# Patient Record
Sex: FEMALE | Race: WHITE | Employment: FULL TIME | URBAN - METROPOLITAN AREA
[De-identification: names, ages, dates, MRNs, and addresses within clinical notes are randomized per-mention and may not be internally consistent; named-entity substitution may affect disease eponyms.]

---

## 2017-01-03 ENCOUNTER — OFFICE VISIT (OUTPATIENT)
Dept: ORTHOPEDIC SURGERY | Facility: CLINIC | Age: 57
End: 2017-01-03

## 2017-01-03 VITALS
WEIGHT: 170 LBS | BODY MASS INDEX: 32.1 KG/M2 | HEIGHT: 61 IN | HEART RATE: 82 BPM | SYSTOLIC BLOOD PRESSURE: 111 MMHG | TEMPERATURE: 96.6 F | DIASTOLIC BLOOD PRESSURE: 64 MMHG

## 2017-01-03 DIAGNOSIS — Z98.890 S/P TRIGGER FINGER RELEASE: Primary | ICD-10-CM

## 2017-01-03 RX ORDER — AZITHROMYCIN 250 MG/1
250 TABLET, FILM COATED ORAL DAILY
COMMUNITY

## 2017-01-03 NOTE — PROGRESS NOTES
HISTORY OF PRESENT ILLNESS:  Elizabeth Baca returns 13 days status post her left ring finger trigger release. She is doing well today. She has minimal pain associated with the release site. She rates it as a 2 on a 10-point scale and has a somewhat dull, achy characteristic. PHYSICAL EXAM:  The surgical site remains intact. There are six interrupted nylon sutures present and dried, bloody drainage associated with the surgical complex. There is soft tissue swelling bracketing the surgical complex has improved. PROCEDURE:  The area was cleaned with alcohol and using clean technique using a needle  and a No. 11 blade, all six nylon sutures were removed with loops identified with no complications. PLAN:  The patient may get the surgical site wet but avoid any prolonged soaking over the next 24 hours. She did request today a copy of her blood work and EKG.

## 2017-01-09 ENCOUNTER — OFFICE VISIT (OUTPATIENT)
Dept: ORTHOPEDIC SURGERY | Facility: CLINIC | Age: 57
End: 2017-01-09

## 2017-01-09 VITALS
BODY MASS INDEX: 31.53 KG/M2 | SYSTOLIC BLOOD PRESSURE: 117 MMHG | TEMPERATURE: 98 F | HEART RATE: 86 BPM | DIASTOLIC BLOOD PRESSURE: 76 MMHG | HEIGHT: 61 IN | WEIGHT: 167 LBS | RESPIRATION RATE: 15 BRPM

## 2017-01-09 DIAGNOSIS — Z98.890 S/P TRIGGER FINGER RELEASE: Primary | ICD-10-CM

## 2017-01-09 DIAGNOSIS — Z48.89 ENCOUNTER FOR POSTOPERATIVE WOUND CHECK: ICD-10-CM

## 2017-01-09 DIAGNOSIS — M79.642 HAND PAIN, LEFT: ICD-10-CM

## 2017-01-09 NOTE — MR AVS SNAPSHOT
Visit Information Date & Time Provider Department Dept. Phone Encounter #  
 1/9/2017  3:00 PM Jose Enrique Yee PA-C [facility] [phone] [id number] Upcoming Health Maintenance Date Due Hepatitis C Screening 1960 DTaP/Tdap/Td series (1 - Tdap) 1/15/1981 PAP AKA CERVICAL CYTOLOGY 1/15/1981 FOBT Q 1 YEAR AGE 50-75 1/15/2010 INFLUENZA AGE 9 TO ADULT 8/1/2016 BREAST CANCER SCRN MAMMOGRAM 4/15/2018 Allergies as of 1/9/2017  Review Complete On: 1/9/2017 By: Jose Enrique Yee PA-C Severity Noted Reaction Type Reactions Codeine  01/08/2013    Other (comments) Gets severe headache from codeine Penicillin G  01/08/2013    Rash Current Immunizations  Never Reviewed No immunizations on file. Not reviewed this visit You Were Diagnosed With   
  
 Codes Comments S/P trigger finger release    -  Primary ICD-10-CM: I08.084 ICD-9-CM: V45.89 Vitals BP Pulse Temp Resp Height(growth percentile) Weight(growth percentile) 117/76 86 98 °F (36.7 °C) 15 5' 1\" (1.549 m) 167 lb (75.8 kg) BMI OB Status Smoking Status 31.55 kg/m2 Hysterectomy Never Smoker Vitals History BMI and BSA Data Body Mass Index Body Surface Area 31.55 kg/m 2 1.81 m 2 Your Updated Medication List  
  
   
This list is accurate as of: 1/9/17  3:28 PM.  Always use your most recent med list.  
  
  
  
  
 ARIPiprazole 15 mg tablet Commonly known as:  ABILIFY  
  
 azithromycin 250 mg tablet Commonly known as:  Alice Arnie Take 250 mg by mouth daily. cetirizine 10 mg tablet Commonly known as:  ZYRTEC TK 1 T PO D  
  
 HYDROcodone-acetaminophen 7.5-325 mg per tablet Commonly known as:  Ramón Punt Take 1 Tab by mouth every six (6) hours as needed for Pain. Max Daily Amount: 4 Tabs. Indications: PAIN, 12/21/16 Left trigger finger release * LIPITOR 20 mg tablet Generic drug:  atorvastatin Take 20 mg by mouth daily. * atorvastatin 10 mg tablet Commonly known as:  LIPITOR TK 1 T PO HS  
  
 * MIRAPEX 0.125 mg tablet Generic drug:  pramipexole Take 0.125 mg by mouth three (3) times daily. * pramipexole 1 mg tablet Commonly known as:  MIRAPEX MUCINEX 1,200 mg Ta12 ER tablet Generic drug:  guaiFENesin TK 1 T PO D  
  
 * PROzac 20 mg capsule Generic drug:  FLUoxetine Take 20 mg by mouth daily. * FLUoxetine 40 mg capsule Commonly known as:  PROzac * RITALIN 10 mg tablet Generic drug:  methylphenidate Take 10 mg by mouth daily. * RITALIN LA 20 mg LA capsule Generic drug:  methylphenidate Take 20 mg by mouth three (3) times daily. * methylphenidate 54 mg CR tablet Commonly known as:  CONCERTA TK 1 T PO QAM FOR ADHD SYNTHROID 100 mcg tablet Generic drug:  levothyroxine Take 100 mcg by mouth Daily (before breakfast). traZODone 100 mg tablet Commonly known as:  Devyn Michelle * Notice: This list has 9 medication(s) that are the same as other medications prescribed for you. Read the directions carefully, and ask your doctor or other care provider to review them with you. Please provide this summary of care documentation to your next provider. Your primary care clinician is listed as Thanh Ruiz. If you have any questions after today's visit, please call [phone].

## 2017-01-09 NOTE — PROGRESS NOTES
HISTORY OF PRESENT ILLNESS:   Paul Yuan returns. She is following for what she believes is a postop complication from her left ring finger trigger release. Her surgical date, December 21, 2016, date of suture removal, January 3, 2017. She is a teacher and a couple of days ago, when she was lifting a heavy stack of paper, she felt a sudden pain associated with the trigger finger release side. She removed the dressing, looked down, and noticed that the skin on the surface had broken and she could see into what she called, inside the wound. She has kept the area dressed since then and would like it rechecked today to confirm there is no infection or worry that the skin has broken down or failed to heal.     PHYSICAL EXAM:  She is a 68-year-old, obese,  female, atraumatic, normocephalic, alert and oriented times three, sitting on the table comfortably. Examination to the left volar hand reveals 1.5 cm proximal from the ring finger metacarpophalangeal joint a transverse, 2 cm incision. There is dry skin bracketing the surgical site. There is no active bleeding. No evidence of erythema or infection. There is new skin visible under the dry, cracked superficial skin. She has full extension of all digits of the left hand without difficulties. She has full flexion and extension of all digits of the left hand without limitation or pain. Distal sensation is intact fully to the left upper extremity. Capillary refill is brisk and less than two seconds to the left upper extremity. There is noted no active or passive triggering of the left ring finger. Today, there is no evidence of complication post-surgically at the surgical site. I discussed this. PLAN:  I discussed her condition and the importance to note of healing, as is the new the new tissue it generates from the basement membrane upwards.   It is not uncommon to see the dry skin on the surface noting that the skin is essentially dead cells and that she may notice further drying and flaking of the skin. She may use cocoa butter or Vitamin E for moisturizer to that area. She was asked to avoid getting surgical site wet in a prolonged fashion for the next three to four days. Should she notice any redness, have increased pain, notice any break in the new skin and/or bleeding, she should call this office as soon as possible for evaluation. Otherwise, we are going to plan on seeing her on a prn basis.

## 2017-11-20 ENCOUNTER — OFFICE VISIT (OUTPATIENT)
Dept: ORTHOPEDIC SURGERY | Facility: CLINIC | Age: 57
End: 2017-11-20

## 2017-11-20 VITALS
HEIGHT: 61 IN | BODY MASS INDEX: 31.87 KG/M2 | OXYGEN SATURATION: 97 % | RESPIRATION RATE: 18 BRPM | SYSTOLIC BLOOD PRESSURE: 113 MMHG | DIASTOLIC BLOOD PRESSURE: 60 MMHG | WEIGHT: 168.8 LBS | HEART RATE: 80 BPM | TEMPERATURE: 96.8 F

## 2017-11-20 DIAGNOSIS — M65.311 TRIGGER FINGER OF RIGHT THUMB: Primary | ICD-10-CM

## 2017-11-20 DIAGNOSIS — Z01.812 BLOOD TESTS PRIOR TO TREATMENT OR PROCEDURE: ICD-10-CM

## 2017-11-20 DIAGNOSIS — M65.311 TRIGGER THUMB OF RIGHT HAND: Primary | ICD-10-CM

## 2017-11-20 DIAGNOSIS — Z01.811 PRE-OPERATIVE RESPIRATORY EXAMINATION: ICD-10-CM

## 2017-11-20 DIAGNOSIS — Z98.890 S/P TRIGGER FINGER RELEASE: ICD-10-CM

## 2017-11-20 DIAGNOSIS — Z01.810 PRE-OPERATIVE CARDIOVASCULAR EXAMINATION: ICD-10-CM

## 2017-11-20 DIAGNOSIS — M79.641 RIGHT HAND PAIN: ICD-10-CM

## 2017-11-20 RX ORDER — METFORMIN HYDROCHLORIDE 500 MG/1
TABLET, EXTENDED RELEASE ORAL
Refills: 3 | COMMUNITY
Start: 2017-11-04

## 2017-11-20 NOTE — MR AVS SNAPSHOT
Visit Information Date & Time Provider Department Dept. Phone Encounter #  
 11/20/2017  3:30 PM Rocael Mayorga, [address] [facility] [place] [id number]  Follow-up Instructions Return if symptoms worsen or fail to improve. Upcoming Health Maintenance Date Due Hepatitis C Screening 1960 DTaP/Tdap/Td series (1 - Tdap) 1/15/1981 PAP AKA CERVICAL CYTOLOGY 1/15/1981 FOBT Q 1 YEAR AGE 50-75 1/15/2010 Influenza Age 5 to Adult 8/1/2017 BREAST CANCER SCRN MAMMOGRAM 4/15/2018 Allergies as of 11/20/2017  Review Complete On: 11/20/2017 By: Rocael Mayorga MD  
  
 Severity Noted Reaction Type Reactions Codeine  01/08/2013    Other (comments) Gets severe headache from codeine Penicillin G  01/08/2013    Rash Current Immunizations  Never Reviewed No immunizations on file. Not reviewed this visit You Were Diagnosed With   
  
 Codes Comments Trigger finger of right thumb    -  Primary ICD-10-CM: J22.946 ICD-9-CM: 727.03 Right hand pain     ICD-10-CM: M79.641 ICD-9-CM: 729.5 S/P trigger finger release     ICD-10-CM: Q85.933 ICD-9-CM: V45.89 Vitals BP Pulse Temp Resp Height(growth percentile) Weight(growth percentile) 113/60 80 96.8 °F (36 °C) (Oral) 18 5' 1\" (1.549 m) 168 lb 12.8 oz (76.6 kg) SpO2 BMI OB Status Smoking Status 97% 31.89 kg/m2 Hysterectomy Never Smoker BMI and BSA Data Body Mass Index Body Surface Area  
 31.89 kg/m 2 1.82 m 2 Your Updated Medication List  
  
   
This list is accurate as of: 11/20/17  4:16 PM.  Always use your most recent med list.  
  
  
  
  
 ARIPiprazole 15 mg tablet Commonly known as:  ABILIFY  
  
 azithromycin 250 mg tablet Commonly known as:  Kevinjamshid Estrada Take 250 mg by mouth daily. cetirizine 10 mg tablet Commonly known as:  ZYRTEC TK 1 T PO D  
  
 HYDROcodone-acetaminophen 7.5-325 mg per tablet Commonly known as:  Gabby Mura Take 1 Tab by mouth every six (6) hours as needed for Pain. Max Daily Amount: 4 Tabs. Indications: PAIN, 12/21/16 Left trigger finger release * LIPITOR 20 mg tablet Generic drug:  atorvastatin Take 20 mg by mouth daily. * atorvastatin 10 mg tablet Commonly known as:  LIPITOR TK 1 T PO HS  
  
 metFORMIN  mg tablet Commonly known as:  GLUCOPHAGE XR  
TK 1 T PO HS  
  
 * MIRAPEX 0.125 mg tablet Generic drug:  pramipexole Take 0.125 mg by mouth three (3) times daily. * pramipexole 1 mg tablet Commonly known as:  MIRAPEX  
three (3) times daily. MUCINEX 1,200 mg Ta12 ER tablet Generic drug:  guaiFENesin TK 1 T PO D  
  
 * PROzac 20 mg capsule Generic drug:  FLUoxetine Take 20 mg by mouth daily. * FLUoxetine 40 mg capsule Commonly known as:  PROzac * RITALIN 10 mg tablet Generic drug:  methylphenidate HCl Take 10 mg by mouth three (3) times daily. * RITALIN LA 20 mg LA capsule Generic drug:  methylphenidate HCl Take 20 mg by mouth three (3) times daily. * methylphenidate HCl 54 mg CR tablet Commonly known as:  CONCERTA TK 1 T PO QAM FOR ADHD SYNTHROID 100 mcg tablet Generic drug:  levothyroxine Take 100 mcg by mouth Daily (before breakfast). traZODone 100 mg tablet Commonly known as:  Crystal Rosas * Notice: This list has 9 medication(s) that are the same as other medications prescribed for you. Read the directions carefully, and ask your doctor or other care provider to review them with you. Follow-up Instructions Return if symptoms worsen or fail to improve. Patient Instructions Trigger Finger Release: Before Your Surgery What is trigger finger release? Trigger finger release is surgery to make it easier to bend and straighten your finger.  Your doctor will make a cut in the tissue over the tendon that helps bend your finger. This cut is called an incision. It will allow the tendon to move freely without pain. This surgery will probably be done while you are awake. The doctor will give you a shot (injection) to numb your hand and prevent pain. You also may get medicine to help you relax. The doctor will make an incision in the skin of your finger or palm. He or she will make a cut to open the tissue over the swollen part of the tendon. The doctor will close the skin incision with stitches. After surgery, you will have a small scar on your finger or palm. This will fade with time. It will probably take about 6 weeks for your hand to heal. After it heals, your finger may move easily without pain. You will go home the same day as the surgery. How soon you can return to work depends on your job. If you can do your job without using your hand, you may be able to go back in 1 or 2 days. But if your job requires you to do repeated finger or hand movements, put pressure on your hand, or lift things, you may need to take more time off work. Follow-up care is a key part of your treatment and safety. Be sure to make and go to all appointments, and call your doctor if you are having problems. It's also a good idea to know your test results and keep a list of the medicines you take. What happens before surgery? ?Surgery can be stressful. This information will help you understand what you can expect. And it will help you safely prepare for surgery. ? Preparing for surgery ? · Understand exactly what surgery is planned, along with the risks, benefits, and other options. · Tell your doctors ALL the medicines, vitamins, supplements, and herbal remedies you take. Some of these can increase the risk of bleeding or interact with anesthesia. ? · If you take blood thinners, such as warfarin (Coumadin), clopidogrel (Plavix), or aspirin, be sure to talk to your doctor.  He or she will tell you if you should stop taking these medicines before your surgery. Make sure that you understand exactly what your doctor wants you to do.  
? · Your doctor will tell you which medicines to take or stop before your surgery. You may need to stop taking certain medicines a week or more before surgery. So talk to your doctor as soon as you can.  
? · If you have an advance directive, let your doctor know. It may include a living will and a durable power of  for health care. Bring a copy to the hospital. If you don't have one, you may want to prepare one. It lets your doctor and loved ones know your health care wishes. Doctors advise that everyone prepare these papers before any type of surgery or procedure. What happens on the day of surgery? · Follow the instructions exactly about when to stop eating and drinking. If you don't, your surgery may be canceled. If your doctor told you to take your medicines on the day of surgery, take them with only a sip of water. ? · Take a bath or shower before you come in for your surgery. Do not apply lotions, perfumes, deodorants, or nail polish. ? · Do not shave the surgical site yourself. ? · Take off all jewelry and piercings. And take out contact lenses, if you wear them. ? At the hospital or surgery center · Bring a picture ID. ? · The area for surgery is often marked to make sure there are no errors. ? · You will be kept comfortable and safe by your anesthesia provider. You may get medicine that relaxes you or puts you in a light sleep. The area being worked on will be numb. ? · The surgery will take less than 1 hour. Going home · Be sure you have someone to drive you home. Anesthesia and pain medicine make it unsafe for you to drive. ? · You will be given more specific instructions about recovering from your surgery. They will cover things like diet, wound care, follow-up care, driving, and getting back to your normal routine. When should you call your doctor? · You have questions or concerns. ? · You don't understand how to prepare for your surgery. ? · You become ill before the surgery (such as fever, flu, or a cold). ? · You need to reschedule or have changed your mind about having the surgery. Where can you learn more? Go to http://dominick-raymon.info/. Enter F208 in the search box to learn more about \"Trigger Finger Release: Before Your Surgery. \" Current as of: March 21, 2017 Content Version: 11.4 © 5689-5847 [employer]. Care instructions adapted under license by [employer] (which disclaims liability or warranty for this information). If you have questions about a medical condition or this instruction, always ask your healthcare professional. [address] any warranty or liability for your use of this information. Please provide this summary of care documentation to your next provider. Your primary care clinician is listed as Thanh Ruiz. If you have any questions after today's visit, please call [phone].

## 2017-11-20 NOTE — PATIENT INSTRUCTIONS
Trigger Finger Release: Before Your Surgery  What is trigger finger release? Trigger finger release is surgery to make it easier to bend and straighten your finger. Your doctor will make a cut in the tissue over the tendon that helps bend your finger. This cut is called an incision. It will allow the tendon to move freely without pain. This surgery will probably be done while you are awake. The doctor will give you a shot (injection) to numb your hand and prevent pain. You also may get medicine to help you relax. The doctor will make an incision in the skin of your finger or palm. He or she will make a cut to open the tissue over the swollen part of the tendon. The doctor will close the skin incision with stitches. After surgery, you will have a small scar on your finger or palm. This will fade with time. It will probably take about 6 weeks for your hand to heal. After it heals, your finger may move easily without pain. You will go home the same day as the surgery. How soon you can return to work depends on your job. If you can do your job without using your hand, you may be able to go back in 1 or 2 days. But if your job requires you to do repeated finger or hand movements, put pressure on your hand, or lift things, you may need to take more time off work. Follow-up care is a key part of your treatment and safety. Be sure to make and go to all appointments, and call your doctor if you are having problems. It's also a good idea to know your test results and keep a list of the medicines you take. What happens before surgery? ?Surgery can be stressful. This information will help you understand what you can expect. And it will help you safely prepare for surgery. ? Preparing for surgery  ? · Understand exactly what surgery is planned, along with the risks, benefits, and other options. · Tell your doctors ALL the medicines, vitamins, supplements, and herbal remedies you take.  Some of these can increase the risk of bleeding or interact with anesthesia. ? · If you take blood thinners, such as warfarin (Coumadin), clopidogrel (Plavix), or aspirin, be sure to talk to your doctor. He or she will tell you if you should stop taking these medicines before your surgery. Make sure that you understand exactly what your doctor wants you to do.   ? · Your doctor will tell you which medicines to take or stop before your surgery. You may need to stop taking certain medicines a week or more before surgery. So talk to your doctor as soon as you can.   ? · If you have an advance directive, let your doctor know. It may include a living will and a durable power of  for health care. Bring a copy to the hospital. If you don't have one, you may want to prepare one. It lets your doctor and loved ones know your health care wishes. Doctors advise that everyone prepare these papers before any type of surgery or procedure. What happens on the day of surgery? · Follow the instructions exactly about when to stop eating and drinking. If you don't, your surgery may be canceled. If your doctor told you to take your medicines on the day of surgery, take them with only a sip of water. ? · Take a bath or shower before you come in for your surgery. Do not apply lotions, perfumes, deodorants, or nail polish. ? · Do not shave the surgical site yourself. ? · Take off all jewelry and piercings. And take out contact lenses, if you wear them. ? At the hospital or surgery center   · Bring a picture ID. ? · The area for surgery is often marked to make sure there are no errors. ? · You will be kept comfortable and safe by your anesthesia provider. You may get medicine that relaxes you or puts you in a light sleep. The area being worked on will be numb. ? · The surgery will take less than 1 hour. Going home   · Be sure you have someone to drive you home. Anesthesia and pain medicine make it unsafe for you to drive.    ? · You will be given more specific instructions about recovering from your surgery. They will cover things like diet, wound care, follow-up care, driving, and getting back to your normal routine. When should you call your doctor? · You have questions or concerns. ? · You don't understand how to prepare for your surgery. ? · You become ill before the surgery (such as fever, flu, or a cold). ? · You need to reschedule or have changed your mind about having the surgery. Where can you learn more? Go to http://dominick-raymon.info/. Enter F208 in the search box to learn more about \"Trigger Finger Release: Before Your Surgery. \"  Current as of: March 21, 2017  Content Version: 11.4  © 4713-9066 [employer]. Care instructions adapted under license by [employer] (which disclaims liability or warranty for this information). If you have questions about a medical condition or this instruction, always ask your healthcare professional. [place] [address] any warranty or liability for your use of this information.

## 2017-11-20 NOTE — PROGRESS NOTES
Patient: Billy Betancourt                MRN: [id number]       SSN: [id number]  YOB: 1960        AGE: 62 y.o. SEX: female    PCP: Jaydon Kothari MD  11/20/17    Chief Complaint   Patient presents with    Hand Pain     Right thumb     HISTORY:  Billy Betancourt is a 62 y.o. female who is seen for right hand pain. She reports triggering of her right thumb. She reports that her right thumb will sometimes lock down at night. She reports increased triggering in the morning. She is experiencing pain with gripping and pinching. She was previously seen for left hand pain. She is s/p left ring finger trigger release on 12/21/17--doing very well. She reports triggering of her left ring finger for the past 4 years with increased symptoms for the past 6 months. She was previously seen by Dr. Pavel Aguilar for left ring trigger finger in June of 2015 and was given cortisone injections with benefit. 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 She is right-handed. She is a metformin controlled diabetic- last A1C 5.7. She reports that her weight is stable. She reports a h/o thyroid problems. Weight Metrics 11/20/2017 1/9/2017 1/3/2017 12/27/2016 12/19/2016 12/2/2016 1/8/2013   Weight 168 lb 12.8 oz 167 lb 170 lb 174 lb 170 lb 165 lb 160 lb   BMI 31.89 kg/m2 31.55 kg/m2 32.12 kg/m2 32.88 kg/m2 32.12 kg/m2 31.18 kg/m2 30.25 kg/m2     There is no problem list on file for this patient.     REVIEW OF SYSTEMS: All Below are Negative except: See HPI   Constitutional: negative for fever, chills, and weight loss. Cardiovascular: negative for chest pain, claudication, leg swelling, SOB, CHINO   Gastrointestinal: Negative for pain, N/V/C/D, Blood in stool or urine, dysuria,  hematuria, incontinence, pelvic pain. Musculoskeletal: See HPI   Neurological: Negative for dizziness and weakness. Negative for headaches, Visual changes, confusion, seizures   Phychiatric/Behavioral: Negative for depression, memory loss, substance  abuse. Extremities: Negative for hair changes, rash, or skin lesion changes. Hematologic: Negative for bleeding problems, bruising, pallor or swollen lymph  nodes   Peripheral Vascular: No calf pain, no circulation deficits. Social History     Social History    Marital status: UNKNOWN     Spouse name: N/A    Number of children: N/A    Years of education: N/A     Occupational History    Not on file. Social History Main Topics    Smoking status: Never Smoker    Smokeless tobacco: Never Used    Alcohol use No    Drug use: Yes     Special: Benzodiazepines      Comment: uses meds to help anxiety and bi polar disorder    Sexual activity: Not Currently     Other Topics Concern    Not on file     Social History Narrative      Allergies   Allergen Reactions    Codeine Other (comments)     Gets severe headache from codeine    Penicillin G Rash      Current Outpatient Prescriptions   Medication Sig    metFORMIN ER (GLUCOPHAGE XR) 500 mg tablet TK 1 T PO HS    atorvastatin (LIPITOR) 10 mg tablet TK 1 T PO HS    pramipexole (MIRAPEX) 1 mg tablet three (3) times daily.  FLUoxetine (PROZAC) 40 mg capsule     methylphenidate ER 54 mg 24 hr tab TK 1 T PO QAM FOR ADHD    ARIPiprazole (ABILIFY) 15 mg tablet     traZODone (DESYREL) 100 mg tablet     cetirizine (ZYRTEC) 10 mg tablet TK 1 T PO D    levothyroxine (SYNTHROID) 100 mcg tablet Take 100 mcg by mouth Daily (before breakfast).       methylphenidate (RITALIN) 10 mg tablet Take 10 mg by mouth three (3) times daily.  azithromycin (ZITHROMAX) 250 mg tablet Take 250 mg by mouth daily.  HYDROcodone-acetaminophen (NORCO) 7.5-325 mg per tablet Take 1 Tab by mouth every six (6) hours as needed for Pain. Max Daily Amount: 4 Tabs. Indications: PAIN, 12/21/16 Left trigger finger release    MUCINEX 1,200 mg Ta12 ER tablet TK 1 T PO D    atorvastatin (LIPITOR) 20 mg tablet Take 20 mg by mouth daily.  pramipexole (MIRAPEX) 0.125 mg tablet Take 0.125 mg by mouth three (3) times daily.  FLUoxetine (PROZAC) 20 mg capsule Take 20 mg by mouth daily.  methylphenidate (RITALIN LA) 20 mg SR capsule Take 20 mg by mouth three (3) times daily. No current facility-administered medications for this visit. PHYSICAL EXAMINATION:  Visit Vitals    /60    Pulse 80    Temp 96.8 °F (36 °C) (Oral)    Resp 18    Ht 5' 1\" (1.549 m)    Wt 168 lb 12.8 oz (76.6 kg)    SpO2 97%    BMI 31.89 kg/m2     PHYSICAL EXAM:  Visit Vitals    /60    Pulse 80    Temp 96.8 °F (36 °C) (Oral)    Resp 18    Ht 5' 1\" (1.549 m)    Wt 168 lb 12.8 oz (76.6 kg)    SpO2 97%    BMI 31.89 kg/m2       Appearance: Alert, well appearing and pleasant patient who is in no distress, oriented to person, place/time, and who follows commands. HEENT: Vinay Rodriguez hears well, does not require hearing aids. Her sclera of the eyes are non-icteric. She is breathing normally and no respiratory accessory muscle use is noted. No JVD present and Neck ROM within normal limits. Psychiatric: Affect and mood are appropriate. Oriented x3  Heart[de-identified]  S1, S2 without murmer, regular rhythm  Lungs:  Breath sounds clear to auscultation  Cardiovascular/Peripheral Vascular: Normal pulses to each foot. Integumentary: No rashes,  wounds, or abrasions. Warm and normal color. No drainage.    Gait: normal  Sensory Exam: Intact/Normal Sensation    Lymphatic: No evidence of Lymphedema  Vascular:       Pulses: palpable  Varicosities none  Wounds/Abrasion: None Present  Neuro: Negative, no tremors  ORTHO EXAMINATION:  Examination Right Hand Left Hand   Skin Intact Well healed ring finger trigger release incision   Deformity - -   Swelling + thumb A1 pulley -   Tenderness + thumb A1 pulley -   Finger flexion Full Full   Finger extension Full Full   Sensation Normal Normal   Capillary refill Normal Normal   Heberden's nodes - -   Dupuytren's - -   Constant triggering right thumb with IP flexion  IMPRESSION:      ICD-10-CM ICD-9-CM    1. Trigger finger of right thumb M65.311 727.03    2. Right hand pain M79.641 729.5    3. S/P trigger finger release Z98.890 V45.89      PLAN:  She will be scheduled for right thumb trigger finger release. Risks of surgery outlined and informed consent obtained.       Scribed by Jesse Kwon ([address]) as dictated by Richa Kaplan MD

## 2017-12-11 ENCOUNTER — HOSPITAL ENCOUNTER (OUTPATIENT)
Dept: GENERAL RADIOLOGY | Age: 57
Discharge: HOME | End: 2017-12-11
Payer: COMMERCIAL

## 2017-12-11 ENCOUNTER — HOSPITAL ENCOUNTER (OUTPATIENT)
Dept: LAB | Age: 57
Discharge: HOME | End: 2017-12-11
Payer: COMMERCIAL

## 2017-12-11 ENCOUNTER — HOSPITAL ENCOUNTER (OUTPATIENT)
Dept: LAB | Age: 57
Discharge: HOME | End: 2017-12-11

## 2017-12-11 DIAGNOSIS — Z01.811 PRE-OPERATIVE RESPIRATORY EXAMINATION: ICD-10-CM

## 2017-12-11 DIAGNOSIS — M65.311 TRIGGER THUMB OF RIGHT HAND: ICD-10-CM

## 2017-12-11 PROCEDURE — 93005 ELECTROCARDIOGRAM TRACING: CPT

## 2017-12-11 PROCEDURE — 99001 SPECIMEN HANDLING PT-LAB: CPT | Performed by: PHYSICIAN ASSISTANT

## 2017-12-11 PROCEDURE — 71020 XR CHEST PA LAT: CPT

## 2017-12-12 LAB
ATRIAL RATE: 74 BPM
CALCULATED P AXIS, ECG09: 32 DEGREES
CALCULATED R AXIS, ECG10: 65 DEGREES
CALCULATED T AXIS, ECG11: 52 DEGREES
DIAGNOSIS, 93000: NORMAL
P-R INTERVAL, ECG05: 182 MS
Q-T INTERVAL, ECG07: 406 MS
QRS DURATION, ECG06: 78 MS
QTC CALCULATION (BEZET), ECG08: 450 MS
VENTRICULAR RATE, ECG03: 74 BPM

## 2017-12-19 ENCOUNTER — OFFICE VISIT (OUTPATIENT)
Dept: ORTHOPEDIC SURGERY | Facility: CLINIC | Age: 57
End: 2017-12-19

## 2017-12-19 VITALS
HEIGHT: 61 IN | HEART RATE: 102 BPM | SYSTOLIC BLOOD PRESSURE: 124 MMHG | WEIGHT: 167 LBS | DIASTOLIC BLOOD PRESSURE: 74 MMHG | OXYGEN SATURATION: 98 % | TEMPERATURE: 96.4 F | BODY MASS INDEX: 31.53 KG/M2

## 2017-12-19 DIAGNOSIS — M65.311 TRIGGER FINGER OF RIGHT THUMB: Primary | ICD-10-CM

## 2017-12-19 DIAGNOSIS — Z01.818 PREOP GENERAL PHYSICAL EXAM: ICD-10-CM

## 2017-12-19 RX ORDER — OXYCODONE AND ACETAMINOPHEN 5; 325 MG/1; MG/1
1 TABLET ORAL
Qty: 24 TAB | Refills: 0 | Status: SHIPPED | OUTPATIENT
Start: 2017-12-19 | End: 2019-05-14 | Stop reason: SDUPTHER

## 2017-12-19 NOTE — H&P
History and Physical         57 year cc obese CC male seen in preparation for Right thumb triggering  Review of Systems   Constitutional: Positive for activity change (decreased use right hand secondary to trigger thumb). Negative for chills, fatigue and fever. HENT: Negative for ear pain. Eyes: Negative for pain. Respiratory: Negative for shortness of breath. Cardiovascular: Negative. Gastrointestinal: Negative for nausea and vomiting. Endocrine: Negative. Musculoskeletal: Positive for arthralgias and myalgias. Skin: Negative. Allergic/Immunologic: Negative. Neurological: Negative. Hematological: Negative. Psychiatric/Behavioral: Negative. Physical Exam   Nursing note and vitals reviewed. Constitutional: She is oriented to person, place, and time. She appears well-developed and well-nourished. HENT:   Head: Normocephalic and atraumatic. Eyes: Conjunctivae and EOM are normal. Pupils are equal, round, and reactive to light. Neck: Normal range of motion. Cardiovascular: Normal rate, regular rhythm, normal heart sounds and intact distal pulses. Pulmonary/Chest: Effort normal and breath sounds normal.   Musculoskeletal:        Hands:  Neurological: She is alert and oriented to person, place, and time. Skin: Skin is warm, dry and intact. Psychiatric: She has a normal mood and affect. Her behavior is normal. Judgment and thought content normal.       Trigger thumb right hand    Trigger release right thumb    Risk / Benefit: Surgeon will discuss in \"face to face\" encounter on day of surgery. Family History reviewed discussed in detail, and deemed Non-Contributory in preparation for this surgical encounter.

## 2017-12-19 NOTE — PROGRESS NOTES
Cristela Dodson returns for History and Physical in preparation of right trigger thumb release. Please see the attached forms in Epic for History, Physical, and Review of systems.

## 2017-12-27 ENCOUNTER — OFFICE VISIT (OUTPATIENT)
Dept: ORTHOPEDIC SURGERY | Facility: CLINIC | Age: 57
End: 2017-12-27

## 2017-12-27 VITALS
SYSTOLIC BLOOD PRESSURE: 136 MMHG | DIASTOLIC BLOOD PRESSURE: 79 MMHG | HEIGHT: 61 IN | BODY MASS INDEX: 31.57 KG/M2 | OXYGEN SATURATION: 99 % | WEIGHT: 167.2 LBS | HEART RATE: 95 BPM

## 2017-12-27 DIAGNOSIS — Z98.890 S/P TRIGGER FINGER RELEASE: Primary | ICD-10-CM

## 2017-12-27 NOTE — PROGRESS NOTES
HISTORY OF PRESENT ILLNESS:  Ezekiel Thomas returns. She is seven days status post her thumb and ring finger trigger release of the right hand. She is doing fair today. She still has some pain at the surgical site. She has changed her dressing daily. PHYSICAL EXAM:  The surgical sites are maturing nicely on exam today. Both are intact to the volar aspect of the thumb metacarpophalangeal joint, transverse, measuring 2.5 cm proximal from the index finger metacarpophalangeal joint of the right hand with vertical mattress sutures noted and measuring transverse 2 cm. PLAN:   Hew sterile dressings are placed today. The patient is going to follow on Tuesday, January 2, 2018, for likely suture removal.  She is going to avoid getting the surgical sites wet. Today, all her questions were answered to her satisfaction.

## 2018-01-02 ENCOUNTER — OFFICE VISIT (OUTPATIENT)
Dept: ORTHOPEDIC SURGERY | Facility: CLINIC | Age: 58
End: 2018-01-02

## 2018-01-02 VITALS
HEART RATE: 99 BPM | OXYGEN SATURATION: 95 % | TEMPERATURE: 96 F | SYSTOLIC BLOOD PRESSURE: 141 MMHG | DIASTOLIC BLOOD PRESSURE: 73 MMHG

## 2018-01-02 DIAGNOSIS — Z98.890 S/P TRIGGER FINGER RELEASE: Primary | ICD-10-CM

## 2018-01-02 DIAGNOSIS — M65.311 TRIGGER FINGER OF RIGHT THUMB: ICD-10-CM

## 2018-01-02 DIAGNOSIS — G89.18 POST-OPERATIVE PAIN: ICD-10-CM

## 2018-01-02 DIAGNOSIS — M79.641 RIGHT HAND PAIN: ICD-10-CM

## 2018-01-02 NOTE — PROGRESS NOTES
HISTORY OF PRESENT ILLNESS:  Avis Perrin returns. She is status post right hand thumb and ring finger trigger release. She is doing well today. She has had some trouble fully extending her ring finger. PHYSICAL EXAM:  The surgical sites associated with the volar right hand is intact and healed nicely. There is no evidence of wound dehiscence or infection. All sutures are removed today with no complications. Sterile Band-Aids were placed. PLAN:   The patient may wash the hand normally. She will avoid any prolonged soaking for the next two to three days. I would like to begin for her occupational therapy for gentle range of motion and some stretching particularly associated with the right ring finger. She is going to follow with our office on a prn basis. Today, all her questions were answered to her satisfaction.

## 2018-01-10 ENCOUNTER — HOSPITAL ENCOUNTER (OUTPATIENT)
Dept: PHYSICAL THERAPY | Age: 58
Discharge: HOME | End: 2018-01-10
Payer: COMMERCIAL

## 2018-01-10 PROCEDURE — 97110 THERAPEUTIC EXERCISES: CPT

## 2018-01-10 PROCEDURE — 97165 OT EVAL LOW COMPLEX 30 MIN: CPT

## 2018-01-10 PROCEDURE — 97140 MANUAL THERAPY 1/> REGIONS: CPT

## 2018-01-10 NOTE — PROGRESS NOTES
[facility]  [address]  [place], [address]  [phone] fax    Plan of Care/Statement of Necessity for Occupational Therapy Services    Patient name: Alana Steiner Start of Care: 1/10/2018   Referral source: Sowmya Campos MD : 1960    Medical Diagnosis: Other specified postprocedural states [Z98.890]  Trigger thumb, right thumb [M65.311]   Onset Date:  17    Treatment Diagnosis: hand pain   Prior Hospitalization: see medical history Provider#: [id number]   Medications: Verified on Patient summary List    Comorbidities: Diabetes, trigger finger release on the left hand, depression, thyroid     Prior Level of Function: Independent without limitations in ADL and IADL activities. The Plan of Care and following information is based on the information from the initial evaluation. Assessment/ key information: Patient is a 62 y.o. female with a chief complaint of pain and limitations in AROM of the right thumb and index finger for flexion. Patient underwent surgical release of pulleys in the thumb and IF for trigger release on 17. Limited use of the right dominant hand. Ridged scar tissue, pain along incision. Patient reports intermittent numbness and tingling in the tips of the digits of the right hand since surgery.     Patient received an initial evaluation today followed by education as to diagnosis, precautions and treatment plan.   Patient was provided with a basic home exercise program including tendon gliding, scar massage and intrinsic stretching.     Evaluation Complexity: History LOW Complexity : Brief history review  Examination LOW Complexity : 1-3 performance deficits relating to physical, cognitive , or psychosocial skils that result in activity limitations and / or participation restrictions  Clinical Decision Making LOW Complexity : No comorbidities that affect functional and no verbal or physical assistance needed to complete eval tasks   Overall Complexity Rating: LOW      Patient would benefit from OT/Hand therapy services for the following problems:  Problem List: Pain effecting function, Decreased range of motion, Decreased strength and Decreased coordination/prehension                     Treatment Plan may include any combination of the following: Therapeutic exercise, Physical agent/modality, Scar management, Manual therapy and Patient education     Patient / Family readiness to learn indicated by: asking questions, trying to perform skills and interest     Persons(s) to be included in education:   patient (P)     Barriers to Learning/Limitations: None     Patient Goal (s): return to full use     Patient Self Reported Health Status: good     Rehabilitation Potential: excellent     Short Term Goals: To be accomplished in 2  weeks:  Goal:* Patient will be compliant with initial home exercise program to take an active role in their rehabilitation process. Status at Eval:  Patient was provided with a basic home exercise program including tendon gliding, scar massage and intrinsic stretching.     Goal:* Patient will demonstrate a good understanding of their condition and strategies for self-management. Status at Eval:  Patient received an initial evaluation today followed by education as to diagnosis, precautions and treatment plan.       Long Term Goals: To be accomplished in 4 weeks:                        Goal:*Patient will regain 55 degrees flexion of the IP of the thumb to enable grasp of cylindrical objects such as a glass, handle or toothbrush. Status at Eval:  39     Goal:*Patient will regain 220 degrees total arc of motion of the right IF to enable grasp of cylindrical objects such as a glass, handle or toothbrush. Status At Eval:  175     Goal:* Patient will show a 10 point improvement on FOTO functional status measure to improve overall functional performance.   Status at Eval:42     Goal:*Patient will report 0/10 pain in the right hand with functional activities. Status at Eval: 3/10     Frequency / Duration: Patient to be seen 1-2 times per week for 4 weeks:     Patient/ Caregiver education and instruction: Diagnosis, prognosis, self care and exercises     Functional Status Measure:  Patient's:42  FOTO Benchmark: 46  Expected Change: 19  FOTO score based on 0 - 100 point scale, with 100 being no impairment. These scores are determined by patient reported functional assessments compared against national benchmarked data.     Hui Maya, OT, CHT, CLT 1/10/2018 8:16 AM  [x]  Plan of care has been reviewed with CHAN    ________    I certify that the above Therapy Services are being furnished while the patient is under my care. I agree with the treatment plan and certify that this therapy is necessary.     Physician's Signature:____________________  Date:____________Time:__________    Please sign and return to [facility] [address] [facility] [address]  [address]  [place], [address]  [phone] fax

## 2018-01-10 NOTE — PROGRESS NOTES
Hand Therapy Evaluation and Daily Note    Patient Name: Estela Walsh  Date:1/10/2018  : 1960  Age: 62 y.o.y/o  [x]  Patient  Verified  Payor: Sienna Fontanez / Plan: Ham Chicas / Product Type: HMO /    Referring Provider: MD Wendy Orr MD Visit:  NA  Onset Date:  1 year  Surgical Date: 17  Surgical Procedure: right thumb and IF trigger finger releases    In time:08  Out time:0900  Total Treatment Time (min): 55    Visit #: 1 of 8    Treatment Area: Other specified postprocedural states [Z98.890]  Trigger thumb, right thumb [M65.311]    Precautions: NA    Hand Dominance: right handed   Hand Involved: right    Total Evaluation Time:  40    History of Present Condition:  Patient is a 62 y.o. female with a chief complaint of pain and limitations in AROM of the right thumb and index finger for flexion. Patient underwent surgical release of pulleys in the thumb and IF for trigger release on 17. Pain Rating:   Current: (0-no pain 10-debilitating pain) 0 / 10   At best: (0-no pain 10-debilitating pain) 0 / 10  At worst: (0-no pain 10-debilitating pain) 3 / 10  Location: right hand  Type:  moderate   Better with: Worse with:     Medications/Allergies/Past Medical History:  See chart; reviewed with patient. Diabetes, trigger finger release on the left hand, depression, thyroid    Diagnostic Tests: NA    Prior Level of Function: Independent without limitations in ADL and IADL activities. Current Level of Function:  Limited use of the right dominant hand    Social History: single, lives alone    Occupation/Job Requirements: teach    Observation: limits use of the ROM in the thumb and IF, full passive ROM  Scar/incision: Thicken scar tissue at both surgical sites.    Location: right hand thumb and IF     Palpation:  Ridged scar tissue, pain along incision    Range of Motion:  THUMB ROM CHART as measured in degrees    Hand ROM- 1/10/18   2nd 3rd 4th 5th Thumb   MP 80 60   PIP 60       DIP 35    45   PABD        RABD        HAYES 175         Strength:   Measurements: Taken with Raz Dynamometer, in Lbs   Level 2 1/10/18 Date Date Date Date Date Date   Right 46         Left 54         Deficit          Change                Pinch Measurements: Taken with Pinch Gauge, in Lbs   (hand) 1/10/18 Date Date Date Date Date   Lateral          Right 6        Left  13        Deficit         Change         Pad         Right 5        Left 10        Deficit         Change         Erick         Right 8        Left 14        Deficit         Change           Sensation:  Patient reports intermittent numbness and tingling in the tips of the digits of the right hand since surgery      Edema: GIRTH CHART measured in cm  Date: 1/10/18    Side right    DPC circum. 19    IF P1 6.4    Thumb P1 6             Special Tests:    Nine-Hole Peg Test:  Left= __19___seconds  Right=__17___seconds      ADLs  Feeding:        []MaxA   []ModA   []Krista   [] CGA   []SBA   []Cody   [x]Independent  UE Dressing:       []MaxA   []ModA   []Krista   [] CGA   []SBA   []Cody   [x]Independent  LE Dressing:       []MaxA   []ModA   []Krista   [] CGA   []SBA   []Cody   [x]Independent  Grooming:       []MaxA   []ModA   []Krista   [] CGA   []SBA   []Coyd   [x]Independent  Toileting:       []MaxA   []ModA   []Krista   [] CGA   []SBA   []Cody   [x]Independent  Bathing:       []MaxA   []ModA   []Krista   [] CGA   []SBA   []Cody   [x]Independent  Light Meal Prep:    []MaxA   []ModA   []Krista   [] CGA   []SBA   []Cody   [x]Independent  Household/Other: []MaxA   []ModA   []Krista   [] CGA   []SBA   []Cody   [x]Independent  Adaptive Equip:     []MaxA   []ModA   []Krista   [] CGA   []SBA   []Cody   [x]Independent  Driving:       []MaxA   []ModA   []Krista   [] CGA   []SBA   []Cody   [x]Independent      Todays Treatment:  Patient received an initial evaluation today followed by education as to diagnosis, precautions and treatment plan.   Patient was provided with a basic home exercise program including tendon gliding, scar massage and intrinsic stretching. OBJECTIVE    15 min Therapeutic Exercise:  [x] See flow sheet :   Rationale: increase ROM and improve coordination to improve the patients ability to make a composite fist    10 min Self Care/Home Management: scar massage, activity ability   Rationale: education  to improve the patients ability to assist with recovery of hand from surgical intervention. With   [] TE   [] TA   [] neuro   [] other: Patient Education: [x] Review HEP    [] Progressed/Changed HEP based on:   [] positioning   [] body mechanics   [] transfers   [] heat/ice application   [] Splint wear/care   [] Sensory re-education   [] scar management      [] other:      Pain Level (0-10 scale) post treatment: 0/10    Patient will continue to benefit from skilled OT services to address ROM deficits, address strength deficits and analyze and address soft tissue restrictions to attain goals. Assessment:  Patient is a 62 y.o. female with a chief complaint of pain and limitations in AROM of the right thumb and index finger for flexion. Patient underwent surgical release of pulleys in the thumb and IF for trigger release on 12/20/17. Limited use of the right dominant hand. Ridged scar tissue, pain along incision. Patient reports intermittent numbness and tingling in the tips of the digits of the right hand since surgery. Patient received an initial evaluation today followed by education as to diagnosis, precautions and treatment plan. Patient was provided with a basic home exercise program including tendon gliding, scar massage and intrinsic stretching.     Evaluation Complexity: History LOW Complexity : Brief history review  Examination LOW Complexity : 1-3 performance deficits relating to physical, cognitive , or psychosocial skils that result in activity limitations and / or participation restrictions  Clinical Decision Making LOW Complexity : No comorbidities that affect functional and no verbal or physical assistance needed to complete eval tasks   Overall Complexity Rating: LOW     Patient would benefit from OT/Hand therapy services for the following problems:  Problem List: Pain effecting function, Decreased range of motion, Decreased strength and Decreased coordination/prehension     Treatment Plan may include any combination of the following: Therapeutic exercise, Physical agent/modality, Scar management, Manual therapy and Patient education    Patient / Family readiness to learn indicated by: asking questions, trying to perform skills and interest    Persons(s) to be included in education:   patient (P)    Barriers to Learning/Limitations: None    Patient Goal (s): return to full use    Patient Self Reported Health Status: good    Rehabilitation Potential: excellent    Short Term Goals: To be accomplished in 2  weeks:  Goal:* Patient will be compliant with initial home exercise program to take an active role in their rehabilitation process. Status at Eval:  Patient was provided with a basic home exercise program including tendon gliding, scar massage and intrinsic stretching. Goal:* Patient will demonstrate a good understanding of their condition and strategies for self-management. Status at Eval:  Patient received an initial evaluation today followed by education as to diagnosis, precautions and treatment plan. Long Term Goals: To be accomplished in 4 weeks:   Goal:*Patient will regain 55 degrees flexion of the IP of the thumb to enable grasp of cylindrical objects such as a glass, handle or toothbrush. Status at Eval:  45    Goal:*Patient will regain 220 degrees total arc of motion of the right IF to enable grasp of cylindrical objects such as a glass, handle or toothbrush.   Status At Eval:  175    Goal:* Patient will show a 10 point improvement on FOTO functional status measure to improve overall functional performance. Status at Eval:42    Goal:*Patient will report 0/10 pain in the right hand with functional activities. Status at Eval: 3/10    Frequency / Duration: Patient to be seen 1-2 times per week for 4 weeks:    Patient/ Caregiver education and instruction: Diagnosis, prognosis, self care and exercises    Functional Status Measure:  Patient's:42  FOTO Benchmark: 46  Expected Change: 19  FOTO score based on 0 - 100 point scale, with 100 being no impairment. These scores are determined by patient reported functional assessments compared against national benchmarked data.     Avani Mccann OT, CHT, CLT 1/10/2018 8:16 AM

## 2018-01-19 ENCOUNTER — HOSPITAL ENCOUNTER (OUTPATIENT)
Dept: PHYSICAL THERAPY | Age: 58
Discharge: HOME | End: 2018-01-19
Payer: COMMERCIAL

## 2018-01-19 PROCEDURE — 97110 THERAPEUTIC EXERCISES: CPT

## 2018-01-19 PROCEDURE — 97140 MANUAL THERAPY 1/> REGIONS: CPT

## 2018-01-19 PROCEDURE — 97035 APP MDLTY 1+ULTRASOUND EA 15: CPT

## 2018-01-20 NOTE — PROGRESS NOTES
OT DAILY TREATMENT NOTE - non [facility] 3-16    Patient Name: Timoteo Morton  Date:2018  : 1960  [x]  Patient  Verified  Payor: BLUE CROSS / Plan: [employer] / Product Type: HMO /    In time:6:05  Out time:6:55  Total Treatment Time (min): 50  Visit #: 2 of 8    Treatment Area: Other specified postprocedural states [Z98.890]  Trigger thumb, right thumb [M65.311]    SUBJECTIVE  Pain Level (0-10 scale): 0/10  Any medication changes, allergies to medications, adverse drug reactions, diagnosis change, or new procedure performed?: [x] No    [] Yes (see summary sheet for update)  Subjective functional status/changes:   [] No changes reported  \"Feeling good. \"    OBJECTIVE  Modality rationale: increase tissue extensibility to improve the patients ability to close R hand to .    Min Type Additional Details    [] Estim:  []Unatt       []IFC  []Premod                        []Other:  []w/ice   []w/heat  Position:  Location:    [] Estim: []Att    []TENS instruct  []NMES                    []Other:  []w/US   []w/ice   []w/heat  Position:  Location:    []  Traction: [] Cervical       []Lumbar                       [] Prone          []Supine                       []Intermittent   []Continuous Lbs:  [] before manual  [] after manual   1100 [x]  Ultrasound: []Continuous   [] Pulsed                           []1MHz   [x]3MHz Location: R palm  W/cm2: 1.0    []  Iontophoresis with dexamethasone         Location: [] Take home patch   [] In clinic   15 []  Ice     [x]  heat  []  Ice massage  []  Laser   []  Anodyne Position:  Location:    []  Laser with stim  []  Other: Position:  Location:    []  Vasopneumatic Device Pressure:       [] lo [] med [] hi   Temperature: [] lo [] med [] hi   [] Skin assessment post-treatment:  []intact []redness- no adverse reaction    []redness  adverse reaction:   10 min Manual Therapy:  IASTM graston tool # 6 sweeping technique   Rationale: increase ROM and increase tissue extensibility to decrease scar tissue build up and improve digit ROM. 15 min Therapeutic Exercise:  [] See flow sheet :   Rationale: increase ROM to improve the patients ability to move R digits to . With   [x] TE   [] TA   [] neuro   [] other: Patient Education: [x] Review HEP    [] Progressed/Changed HEP based on:   [] positioning   [] body mechanics   [] transfers   [] heat/ice application   [] Splint wear/care   [] Sensory re-education   [] scar management      [] other:             Other Objective/Functional Measures:  THUMB ROM CHART as measured in degrees     Hand ROM- 1/10/18    2nd 3rd 4th 5th Thumb   MP 80       60   PIP 60           DIP 35       45   PABD             RABD             HAYES 175              Strength:   Measurements: Taken with Raz Dynamometer, in Lbs   Level 2 1/10/18 Date Date Date Date Date Date   Right 46               Left 54               Deficit                 Change                      Pinch Measurements: Taken with Pinch Gauge, in Lbs   (hand) 1/10/18 Date Date Date Date Date   Lateral                Right 6             Left  13             Deficit               Change               Pad               Right 5             Left 10             Deficit               Change               Erick               Right 8             Left 14             Deficit               Change                  Sensation:  Patient reports intermittent numbness and tingling in the tips of the digits of the right hand since surgery        Edema: GIRTH CHART measured in cm  Date: 1/10/18     Side right     DPC circum. 19     IF P1 6.4     Thumb P1 6                  Pain Level (0-10 scale) post treatment: 0/10    ASSESSMENT/Changes in Function: Patient tolerated US to R palm. Patient required physical and verbal cueing for holding wrist in neutral position to perform tendon gliding exercises.     Patient will continue to benefit from skilled OT services to modify and progress therapeutic interventions, address ROM deficits, address strength deficits and analyze and address soft tissue restrictions to attain remaining goals. [x]  See Plan of Care   []  See progress note/recertification  []  See Discharge Summary         Progress towards goals / Updated goals:  Short Term Goals: To be accomplished in 2  weeks:  Goal:* Patient will be compliant with initial home exercise program to take an active role in their rehabilitation process. Status at Eval:  Patient was provided with a basic home exercise program including tendon gliding, scar massage and intrinsic stretching.     Goal:* Patient will demonstrate a good understanding of their condition and strategies for self-management. Status at Eval:  Patient received an initial evaluation today followed by education as to diagnosis, precautions and treatment plan.       Long Term Goals: To be accomplished in 4 weeks:                        Goal:*Patient will regain 55 degrees flexion of the IP of the thumb to enable grasp of cylindrical objects such as a glass, handle or toothbrush. Status at Eval:  39     Goal:*Patient will regain 220 degrees total arc of motion of the right IF to enable grasp of cylindrical objects such as a glass, handle or toothbrush. Status At Eval:  175     Goal:* Patient will show a 10 point improvement on FOTO functional status measure to improve overall functional performance. Status at Eval:42     Goal:*Patient will report 0/10 pain in the right hand with functional activities.   Status at Eval: 3/10       PLAN  [x]  Upgrade activities as tolerated     [x]  Continue plan of care  []  Update interventions per flow sheet       []  Discharge due to:_  []  Other:_      Flakita Suarez OT 1/19/2018  7:30 PM    Future Appointments  Date Time Provider Millicent Fischer   1/23/2018 4:30 PM Flakita Suarez OT [facility]   [facility]2018 [facility]   2/1/2018 4:30 PM Flakita Suarez OT [facility]   2/8/2018 4:30 PM [facility]

## 2018-01-23 ENCOUNTER — APPOINTMENT (OUTPATIENT)
Dept: PHYSICAL THERAPY | Age: 58
End: 2018-01-23
Payer: COMMERCIAL

## 2018-01-25 ENCOUNTER — HOSPITAL ENCOUNTER (OUTPATIENT)
Dept: PHYSICAL THERAPY | Age: 58
Discharge: HOME | End: 2018-01-25
Payer: COMMERCIAL

## 2018-01-25 PROCEDURE — 97035 APP MDLTY 1+ULTRASOUND EA 15: CPT

## 2018-01-25 PROCEDURE — 97140 MANUAL THERAPY 1/> REGIONS: CPT

## 2018-01-25 PROCEDURE — 97110 THERAPEUTIC EXERCISES: CPT

## 2018-01-25 NOTE — PROGRESS NOTES
OT DAILY TREATMENT NOTE - non [facility] 3-16    Patient Name: Reji Stout  Date:2018  : 1960  [x]  Patient  Verified   Payor: DICK QUEVEDO / Plan: Johann Tinoco / Product Type: HMO /    In time:4:30  Out time:5  Total Treatment Time (min): 5:20  Visit #: 3 of 8    Treatment Area: Other specified postprocedural states [Z98.890]  Trigger thumb, right thumb [M65.311]    SUBJECTIVE   Pain Level (0-10 scale): 0/10 still, 3/10 w/ movement  Any medication changes, allergies to medications, adverse drug reactions, diagnosis change, or new procedure performed?: [x] No    [] Yes (see summary sheet for update)  Subjective functional status/changes:   [] No changes reported  \"I really liked the massage we did last visit and it really seemed to make my hand feel a lot better. \"    OBJECTIVE  Modality rationale: increase tissue extensibility to improve the patients ability to flex R hand digits to .    Min Type Additional Details    [] Estim:  []Unatt       []IFC  []Premod                        []Other:  []w/ice   []w/heat  Position:  Location:    [] Estim: []Att    []TENS instruct  []NMES                    []Other:  []w/US   []w/ice   []w/heat  Position:  Location:    []  Traction: [] Cervical       []Lumbar                       [] Prone          []Supine                       []Intermittent   []Continuous Lbs:  [] before manual  [] after manual   1100 [x]  Ultrasound: []Continuous   [] Pulsed                           []1MHz   [x]3MHz Location: R palm  W/cm2: 1.0    []  Iontophoresis with dexamethasone         Location: [] Take home patch   [] In clinic   15 []  Ice     [x]  heat  []  Ice massage  []  Laser   []  Anodyne Position:  Location:    []  Laser with stim  []  Other: Position:  Location:    []  Vasopneumatic Device Pressure:       [] lo [] med [] hi   Temperature: [] lo [] med [] hi   [x] Skin assessment post-treatment:  [x]intact []redness- no adverse reaction    []redness  adverse reaction: 15 min Therapeutic Exercise:  [x] See flow sheet :   Rationale: increase ROM to improve the patients ability to flex R digits to  objects. Tendon glides  DIP and PIP Blocking ex's  Added in hand manip: Translation from digit to palm and palm to digit    10 min Manual Therapy:  IASTM graston tool #6 sweeping technique   Rationale: increase ROM and increase tissue extensibility to decrease scar tissue build up and improve R hand digit ROM. With   [x] TE   [] TA   [] neuro   [] other: Patient Education: [x] Review HEP    [] Progressed/Changed HEP based on:   [] positioning   [] body mechanics   [] transfers   [] heat/ice application   [] Splint wear/care   [] Sensory re-education   [] scar management      [] other:             Other Objective/Functional Measures:  THUMB ROM CHART as measured in degrees      Hand ROM- 1/10/18     2nd 3rd 4th 5th Thumb   MP 80          60   PIP 60               DIP 35          45   PABD                  RABD                  HAYES 175                   Strength:   Measurements: Taken with Raz Dynamometer, in Lbs  Mad[place] 2 1/10/18 Date Date Date Date Date Date   Right 46                     Left 54                     Deficit                        Change                             Pinch Measurements: Taken with Pinch Gauge, in Lbs   (hand) 1/10/18 Date Date Date Date Date   Lateral                      Right 6                  Left  13                  Deficit                     Change                     Pad                     Right 5                  Left 10                  Deficit                     Change                     Erick                     Right 8                  Left 14                  Deficit                     Change                         Sensation:  Patient reports intermittent numbness and tingling in the tips of the digits of the right hand since [address] measured in cm  Date: 1/10/18      Side right      DPC circum. 19      IF P1 6.4      Thumb P1 6                                           Pain Level (0-10 scale) post treatment: 0/10    ASSESSMENT/Changes in Function: Tendon glides are challenging for patient to complete, however patient demonstrates good carryover of technique. Decreased scar tissue noted since previous tx session. Patient met STG's. Patient will continue to benefit from skilled OT services to modify and progress therapeutic interventions, address ROM deficits, address strength deficits and analyze and address soft tissue restrictions to attain remaining goals. [x]  See Plan of Care  []  See progress note/recertification  []  See Discharge Summary         Progress towards goals / Updated goals:  Short Term Goals: To be accomplished in 2  weeks:  Goal:* Patient will be compliant with initial home exercise program to take an active role in their rehabilitation process. Goal Met 1/25/18  Status at Eval:  Patient was provided with a basic home exercise program including tendon gliding, scar massage and intrinsic stretching.      Goal:* Patient will demonstrate a good understanding of their condition and strategies for self-management. Goal Met 1/25/18  Status at Eval:  Patient received an initial evaluation today followed by education as to diagnosis, precautions and treatment plan.        Long Term Goals: To be accomplished in 4 weeks:                        Goal:*Patient will regain 55 degrees flexion of the IP of the thumb to enable grasp of cylindrical objects such as a glass, handle or toothbrush. Status at Eval:  42      Goal:*Patient will regain 220 degrees total arc of motion of the right IF to enable grasp of cylindrical objects such as a glass, handle or toothbrush. Status At Eval: [address]      Goal:* Patient will show a 10 point improvement on FOTO functional status measure to improve overall functional performance.   Status at Eval:42      Goal:*Patient will report 0/10 pain in the right hand with functional activities.   Status at Eval: 3/10    PLAN  [x]  Upgrade activities as tolerated     [x]  Continue plan of care  []  Update interventions per flow sheet       []  Discharge due to:_  []  Other:_      Ai Jj OT 1/25/2018  4:43 PM    Future Appointments  Date Time Provider Millicent Fischer   2/1/2018 4:30 PM Ai Jj OT [facility]   2/8/2018 4:30 PM [facility]

## 2018-02-01 ENCOUNTER — HOSPITAL ENCOUNTER (OUTPATIENT)
Dept: PHYSICAL THERAPY | Age: 58
Discharge: HOME | End: 2018-02-01
Payer: COMMERCIAL

## 2018-02-01 PROCEDURE — 97140 MANUAL THERAPY 1/> REGIONS: CPT

## 2018-02-01 PROCEDURE — 97035 APP MDLTY 1+ULTRASOUND EA 15: CPT

## 2018-02-01 NOTE — PROGRESS NOTES
OT DAILY TREATMENT NOTE - non [facility] 3-16    Patient Name: Sammie Chavez  Date:2018  : 1960  [x]  Patient  Verified  Payor: BLUE CROSS / Plan: Gregg Arreguin / Product Type: HMO /    In time:4:30  Out time:5:03  Total Treatment Time (min): 33  Visit #: 4 of 8    Treatment Area: Other specified postprocedural states [Z98.890]  Trigger thumb, right thumb [M65.311]    SUBJECTIVE  Pain Level (0-10 scale): 0/10 still, 4/10 moving  Any medication changes, allergies to medications, adverse drug reactions, diagnosis change, or new procedure performed?: [x] No    [] Yes (see summary sheet for update)  Subjective functional status/changes:   [] No changes reported  \"My thumb is feeling a lot better, I am able to mov e it without as much pain. Most pain is still in the index finger. \"    OBJECTIVE  Modality rationale: increase tissue extensibility to improve the patients ability to flex R hand digits to  tightly.    Min Type Additional Details    [] Estim:  []Unatt       []IFC  []Premod                        []Other:  []w/ice   []w/heat  Position:  Location:    [] Estim: []Att    []TENS instruct  []NMES                    []Other:  []w/US   []w/ice   []w/heat  Position:  Location:    []  Traction: [] Cervical       []Lumbar                       [] Prone          []Supine                       []Intermittent   []Continuous Lbs:  [] before manual  [] after manual   1010 [x]  Ultrasound: [x]Continuous   [] Pulsed                           []1MHz   [x]3MHz Location: R volar thumb, IF  W/cm2:1.0    []  Iontophoresis with dexamethasone         Location: [] Take home patch   [] In clinic   10 []  Ice     [x]  heat  []  Ice massage  []  Laser   []  Anodyne Position:  Location: R hand    []  Laser with stim  []  Other: Position:  Location:    []  Vasopneumatic Device Pressure:       [] lo [] med [] hi   Temperature: [] lo [] med [] hi   [x] Skin assessment post-treatment:  [x]intact []redness- no adverse reaction    []redness  adverse reaction:     10 min Manual Therapy:  Graston tool #6 sweeping technique   Rationale: increase ROM and increase tissue extensibility to decrease scar tissue build-up and improve R hand digit ROM. With   [] TE   [] TA   [] neuro   [] other: Patient Education: [x] Review HEP    [] Progressed/Changed HEP based on:   [] positioning   [] body mechanics   [] transfers   [] heat/ice application   [] Splint wear/care   [] Sensory re-education   [] scar management      [] other:             Other Objective/Functional Measures:  Hand ROM- 1/10/18     2nd 3rd 4th 5th Thumb   MP 80          60   PIP 60               DIP 35          45   PABD                  RABD                  HAYES 175                   Strength:   Measurements: Taken with Raz Dynamometer, in Lbs  Madrano 2 1/10/18 Date Date Date Date Date Date   Right 46                     Left 54                     Deficit                        Change                             Pinch Measurements: Taken with Pinch Gauge, in Lbs   (hand) 1/10/18 Date Date Date Date Date   Lateral                      Right 6                  Left  13                  Deficit                     Change                     Pad                     Right 5                  Left 10                  Deficit                     Change                     Erick                     Right 8                  Left 14                  Deficit                     Change                         Sensation:  Patient reports intermittent numbness and tingling in the tips of the digits of the right hand since [address] measured in cm  Date: 1/10/18      Side right      DPC circum. 19      IF P1 6.4      Thumb P1 6                   Pain Level (0-10 scale) post treatment: 0/10    ASSESSMENT/Changes in Function: Patient able to make fist w/ report of minor pain. Progressing towards goals.     Patient will continue to benefit from skilled OT services to modify and progress therapeutic interventions, address ROM deficits and address strength deficits to attain remaining goals. [x]  See Plan of Care  []  See progress note/recertification  []  See Discharge Summary         Progress towards goals / Updated goals:  Short Term Goals: To be accomplished in 2  weeks:  Goal:* Patient will be compliant with initial home exercise program to take an active role in their rehabilitation process. Goal Met 1/25/18  Status at Eval:  Patient was provided with a basic home exercise program including tendon gliding, scar massage and intrinsic stretching.      Goal:* Patient will demonstrate a good understanding of their condition and strategies for self-management. Goal Met 1/25/18  Status at Eval:  Patient received an initial evaluation today followed by education as to diagnosis, precautions and treatment plan.        Long Term Goals: To be accomplished in 4 weeks:                        Goal:*Patient will regain 55 degrees flexion of the IP of the thumb to enable grasp of cylindrical objects such as a glass, handle or toothbrush. Status at Eval:  42      Goal:*Patient will regain 220 degrees total arc of motion of the right IF to enable grasp of cylindrical objects such as a glass, handle or toothbrush. Status At Eval: [address]      Goal:* Patient will show a 10 point improvement on FOTO functional status measure to improve overall functional performance. Status at Eval:42      Goal:*Patient will report 0/10 pain in the right hand with functional activities.   Status at Eval: 3/10    PLAN  [x]  Upgrade activities as tolerated     [x]  Continue plan of care  []  Update interventions per flow sheet       []  Discharge due to:_  []  Other:_      Jun Oropeza OT 2/1/2018  4:31 PM    Future Appointments  Date Time Provider Millicent Fischer   2/8/2018 4:30 PM Liliana Chirinos OT [facility]

## 2018-02-08 ENCOUNTER — HOSPITAL ENCOUNTER (OUTPATIENT)
Dept: PHYSICAL THERAPY | Age: 58
Discharge: HOME | End: 2018-02-08
Payer: COMMERCIAL

## 2018-02-08 PROCEDURE — 97110 THERAPEUTIC EXERCISES: CPT

## 2018-02-08 PROCEDURE — 97140 MANUAL THERAPY 1/> REGIONS: CPT

## 2018-02-09 NOTE — PROGRESS NOTES
OT DAILY TREATMENT NOTE  3-16    Patient Name: Nash Trevino  Date:2018  : 1960  [x]  Patient  Verified  Payor: BLUE CROSS / Plan: Randi Case / Product Type: HMO /    In time:  Out time:1730  Total Treatment Time (min): 50  Visit #: 5 of 8    Treatment Area: Other specified postprocedural states [Z98.890]  Trigger thumb, right thumb [M65.311]    SUBJECTIVE  Pain Level (0-10 scale): 0/10  Any medication changes, allergies to medications, adverse drug reactions, diagnosis change, or new procedure performed?: [x] No    [] Yes (see summary sheet for update)  Subjective functional status/changes:   [] No changes reported  \"It hurts sometimes when I bend it. \"    OBJECTIVE    Modality rationale: decrease pain and increase tissue extensibility to improve the patients ability to move scar tissues   Min Type Additional Details    [] Estim:  []Unatt       []IFC  []Premod                        []Other:  []w/ice   []w/heat  Position:  Location:    [] Estim: []Att    []TENS instruct  []NMES                    []Other:  []w/US   []w/ice   []w/heat  Position:  Location:    []  Traction: [] Cervical       []Lumbar                       [] Prone          []Supine                       []Intermittent   []Continuous Lbs:  [] before manual  [] after manual    []  Ultrasound: []Continuous   [] Pulsed                           []1MHz   []3MHz W/cm2:  Location:    []  Iontophoresis with dexamethasone         Location: [] Take home patch   [] In clinic   15 []  Ice     [x]  heat  []  Ice massage  []  Laser   []  Anodyne Position:resting  Location: right hand    []  Laser with stim  []  Other:  Position:  Location:    []  Vasopneumatic Device Pressure:       [] lo [] med [] hi   Temperature: [] lo [] med [] hi   [x] Skin assessment post-treatment:  [x]intact []redness- no adverse reaction    []redness  adverse reaction:     20 min Therapeutic Exercise:  [x] See flow sheet :   Rationale: increase ROM and increase strength to improve the patients ability to return to full functional use of the hand    10 min Manual Therapy:  IASTM with Graston tool # 3, using sweeping and fanning technique   Rationale: decrease pain, increase ROM and increase tissue extensibility to right hand    With   [] TE   [] TA   [] neuro   [] other: Patient Education: [x] Review HEP    [] Progressed/Changed HEP based on:   [] positioning   [] body mechanics   [] transfers   [] heat/ice application   [] Splint wear/care   [] Sensory re-education   [] scar management      [] other:             Other Objective/Functional Measures: Hand ROM- 1/10/18     2nd 3rd 4th 5th Thumb   MP 80          60   PIP 60               DIP 35          45   PABD                  RABD                  HAYES 175                   Strength:   Measurements: Taken with Raz Dynamometer, in Lbs  Madrano 2 1/10/18 Date Date Date Date Date Date   Right 46                     Left 54                     Deficit                        Change                             Pinch Measurements: Taken with Pinch Gauge, in Lbs   (hand) 1/10/18 Date Date Date Date Date   Lateral                      Right 6                  Left  13                  Deficit                     Change                     Pad                     Right 5                  Left 10                  Deficit                     Change                     Erick                     Right 8                  Left 14                  Deficit                     Change                         Sensation:  Patient reports intermittent numbness and tingling in the tips of the digits of the right hand since [address] measured in cm  Date: 1/10/18      Side right      DPC circum.  19      IF P1 6.4      Thumb P1 6                    Pain Level (0-10 scale) post treatment: 0/10     ASSESSMENT/Changes in Function: Making fist, continues to have increased pain but improved overall use.   Patient will continue to benefit from skilled OT services to modify and progress therapeutic interventions, address ROM deficits and address strength deficits to attain remaining goals.      [x]  See Plan of Care  []  See progress note/recertification  []  See Discharge Summary      Progress towards goals / Updated goals:  Short Term Goals: To be accomplished in 2  weeks:  Goal:* Patient will be compliant with initial home exercise program to take an active role in their rehabilitation process. Goal Met 1/25/18  Status at Eval:  Patient was provided with a basic home exercise program including tendon gliding, scar massage and intrinsic stretching.      Goal:* Patient will demonstrate a good understanding of their condition and strategies for self-management. Goal Met 1/25/18  Status at Eval:  Patient received an initial evaluation today followed by education as to diagnosis, precautions and treatment plan.        Long Term Goals: To be accomplished in 4 weeks:                        Goal:*Patient will regain 55 degrees flexion of the IP of the thumb to enable grasp of cylindrical objects such as a glass, handle or toothbrush. Status at Eval:  42      Goal:*Patient will regain 220 degrees total arc of motion of the right IF to enable grasp of cylindrical objects such as a glass, handle or toothbrush. Status At Eval: [address]      Goal:* Patient will show a 10 point improvement on FOTO functional status measure to improve overall functional performance. Status at Eval:42      Goal:*Patient will report 0/10 pain in the right hand with functional activities. Status at Eval: 3/10    PLAN  []  Upgrade activities as tolerated     [x]  Continue plan of care  []  Update interventions per flow sheet       []  Discharge due to:_  []  Other:_      Ailyn Vaughn OT 2/8/2018  8:43 PM    No future appointments.

## 2018-03-08 DIAGNOSIS — M65.321 TRIGGER INDEX FINGER OF RIGHT HAND: ICD-10-CM

## 2018-03-08 DIAGNOSIS — M65.311 TRIGGER THUMB OF RIGHT HAND: Primary | ICD-10-CM

## 2018-03-20 NOTE — PROGRESS NOTES
[facility]  [address]  [place], [address]  [phone] fax    Occupational Therapy Discharge Summary    Patient name: Cely Rivera Start of Care: 1/10/2018   Referral source: Lianna Vyas MD : 1960   Medical/Treatment Diagnosis: Other specified postprocedural states [Z98.890]  Trigger thumb, right thumb [M65.311] Onset Date:2017     Prior Hospitalization: see medical history Provider#: [id number]   Medications: Verified on Patient Summary List    Comorbidities: diabetes, trigger finger release on left hand, depression, thyroid  Prior Level of Function:Independent without limitations in ADL and IADL activities. Visits from Start of Care: 5    Missed Visits: 0  Reporting Period : 1/10/2018 to 2018  Summary of Care:  Short Term Goals: To be accomplished in 2  weeks:  Goal:* Patient will be compliant with initial home exercise program to take an active role in their rehabilitation process. Goal Met 18  Status at [facility]:  Patient was provided with a basic home exercise program including tendon gliding, scar massage and intrinsic stretching.    Goal:* Patient will demonstrate a good understanding of their condition and strategies for self-management. Goal Met 18  Status at al:  Patient received an initial evaluation today followed by education as to diagnosis, precautions and treatment plan.    Long Term Goals: To be accomplished in 4 weeks:                        Goal:*Patient will regain 55 degrees flexion of the IP of the thumb to enable grasp of cylindrical objects such as a glass, handle or toothbrush. Status at Eval: [place]  Goal:*Patient will regain 220 degrees total arc of motion of the right IF to enable grasp of cylindrical objects such as a glass, handle or toothbrush.   Status At Eval: [address]  Goal:* Patient will show a 10 point improvement on FOTO functional status measure to improve overall functional performance. Status at Eval:42  Goal:*Patient will report 0/10 pain in the right hand with functional activities. Status at Eval: 3/10    ASSESSMENT/RECOMMENDATIONS: Unable to assess all goals. Clinic made 3 attempts to contact pt to schedule remainder of follow up appts and pt did not call back to schedule. D/C at this time.   [x]Discontinue therapy: []Patient has reached or is progressing toward set goals      [x]Patient is non-compliant or has abdicated      []Due to lack of appreciable progress towards set goals    Jun Oropeza OT 3/20/2018 1:29 PM

## 2018-09-18 ENCOUNTER — HOSPITAL ENCOUNTER (OUTPATIENT)
Dept: MAMMOGRAPHY | Age: 58
Discharge: HOME | End: 2018-09-18
Attending: FAMILY MEDICINE
Payer: COMMERCIAL

## 2018-09-18 DIAGNOSIS — Z12.31 VISIT FOR SCREENING MAMMOGRAM: ICD-10-CM

## 2018-09-18 PROCEDURE — 77063 BREAST TOMOSYNTHESIS BI: CPT

## 2019-05-09 ENCOUNTER — OFFICE VISIT (OUTPATIENT)
Dept: ORTHOPEDIC SURGERY | Facility: CLINIC | Age: 59
End: 2019-05-09

## 2019-05-09 VITALS
TEMPERATURE: 96.1 F | WEIGHT: 171.2 LBS | HEIGHT: 61 IN | RESPIRATION RATE: 16 BRPM | BODY MASS INDEX: 32.32 KG/M2 | HEART RATE: 94 BPM | DIASTOLIC BLOOD PRESSURE: 60 MMHG | OXYGEN SATURATION: 96 % | SYSTOLIC BLOOD PRESSURE: 111 MMHG

## 2019-05-09 DIAGNOSIS — M79.644 FINGER PAIN, RIGHT: ICD-10-CM

## 2019-05-09 DIAGNOSIS — Z01.810 PRE-OPERATIVE CARDIOVASCULAR EXAMINATION: ICD-10-CM

## 2019-05-09 DIAGNOSIS — Z01.811 PRE-OPERATIVE RESPIRATORY EXAMINATION: ICD-10-CM

## 2019-05-09 DIAGNOSIS — M65.341 TRIGGER FINGER, RIGHT RING FINGER: Primary | ICD-10-CM

## 2019-05-09 DIAGNOSIS — Z01.812 BLOOD TESTS PRIOR TO TREATMENT OR PROCEDURE: ICD-10-CM

## 2019-05-09 NOTE — LETTER
Marcial Ly M.D. PATIENT SURGERY INSTRUCTIONS Patient: Joellen Giraldo      1960 Surgery Date: 5/15/2019  Time: 12:30 p.m. Report to [facility]  at: 11:00 a.m. PROCEDURE: Right Ring Trigger Finger Release PRE-OPERATIVE INSTRUCTIONS:  Starting  5/9/2019 *  Ten (10) days prior to surgery STOP taking aspirin and/or anti-inflammatory      medications. If you are taking blood thinner medication (such as Coumadin, Plavix,      Heparin, Aggrenox or others) you will need to STOP ten (10) days prior to surgery. Please call and inform the prescribing physician. *   Take no Nicotine or Alcohol products ten (10) days prior to surgery. LAB: Report to the Lab of your choice at [facility] or  (with your enclosed order) on 5/11/2019,  before your surgery date. Cannot be more than 14 days prior to scheduled surgery date. Take insurance cards and medication list.  Also expect a call from a PAT Nurse to perform a Health Assessment. **   It doesn't matter if you have eaten before going to the Lab. THE DAY OF SURGERY:  
      1. Do not eat, chew gum or drink anything after Midnight prior to the date of your surgery. 2. Take your regular medications with small sips of water unless otherwise instructed. (This means blood pressure and/or heart medicine). If you are insulin dependent, bring your insulin with you, unless otherwise instructed. 3.   Bring a list of your medications and the dosage to the hospital.  
      4.   Do not wear nail polish, make-up or jewery. 5.   Do not bring valuables or money to the hospital. 
      6.   Have someone accompany you so they may drive you home following the surgery. Your Pre-Surgical Appointment (History & Physical) is scheduled with Janes Rushing/Dr. Nika Gonzalez  on 5/14/2019 @ 3:45 p.m. at the [facility]. **All of these Appointments must be kept--If any are missed your surgery will be cancelled and rescheduled. Also, lack of communication with surgical coordinator can result in cancellation. **  Preauthorization for your admission/procedure will be obtained if deemed necessary. Return for your Postoperative Appointment (After surgery) is scheduled with Janes Rushing/Dr. Tamar Perez on 5/17/2019 @ 9:00 a.m.  at the [facility]. Ike Zepeda, Surgery Scheduler  [phone].

## 2019-05-09 NOTE — PROGRESS NOTES
Patient: Randa Polanco                MRN: [id number]       SSN: [id number] YOB: 1960        AGE: 61 y.o. SEX: female PCP: Odette Pedraza MD 
05/09/19 Chief Complaint Patient presents with  
 Hand Pain  
  right ring finger locking HISTORY:  Randa Polanco is a 61 y.o. female who is seen for right ring finger pain and triggering. She has been experiencing catching, popping, and triggering of her right ring finger for the past 8 months. There is no history of injury. Ms. Susana Villasenor reports that in the morning her right ring finger gets trapped in flexion and she has to snap it into extension with her other hand. She responded to prior trigger releases. She notes her right ring finger was locked for three days last week. She is s/p right trigger thumb release on 12/20/17 and is doing well. She is s/p left ring finger trigger release on 12/21/16--doing very well. She was previously seen by Dr. Ion Stringer for left ring trigger finger in June of 2015 and was given cortisone injections with benefit. Pain Assessment  5/9/2019 Location of Pain Finger Pain Location Comment ringer finger Location Modifiers Right Severity of Pain 2 Quality of Pain Locking;Aching Duration of Pain Persistent Frequency of Pain Constant Aggravating Factors Bending Aggravating Factors Comment - Limiting Behavior -  
Relieving Factors Nothing Relieving Factors Comment - Result of Injury No  
 
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 She is right-handed. She is a metformin controlled diabetic- last A1C 5.7. She reports that her weight is stable. She reports a h/o thyroid problems. Weight Metrics 5/9/2019 12/27/2017 12/19/2017 11/20/2017 1/9/2017 1/3/2017 12/27/2016 Weight 171 lb 3.2 oz 167 lb 3.2 oz 167 lb 168 lb 12.8 oz 167 lb 170 lb 174 lb  
 BMI 32.35 kg/m2 31.59 kg/m2 31.55 kg/m2 31.89 kg/m2 31.55 kg/m2 32.12 kg/m2 32.88 kg/m2 There is no problem list on file for this patient. REVIEW OF SYSTEMS: All Below are Negative except: See HPI Constitutional: negative for fever, chills, and weight loss. Cardiovascular: negative for chest pain, claudication, leg swelling, SOB, CHINO Gastrointestinal: Negative for pain, N/V/C/D, Blood in stool or urine, dysuria,  hematuria, incontinence, pelvic pain. Musculoskeletal: See HPI Neurological: Negative for dizziness and weakness. Negative for headaches, Visual changes, confusion, seizures Phychiatric/Behavioral: Negative for depression, memory loss, substance  abuse. Extremities: Negative for hair changes, rash, or skin lesion changes. Hematologic: Negative for bleeding problems, bruising, pallor or swollen lymph  nodes Peripheral Vascular: No calf pain, no circulation deficits. Social History Socioeconomic History  Marital status: SINGLE Spouse name: Not on file  Number of children: Not on file  Years of education: Not on file  Highest education level: Not on file Occupational History  Not on file Social Needs  Financial resource strain: Not on file  Food insecurity:  
  Worry: Not on file Inability: Not on file  Transportation needs:  
  Medical: Not on file Non-medical: Not on file Tobacco Use  Smoking status: Never Smoker  Smokeless tobacco: Never Used Substance and Sexual Activity  Alcohol use: No  
 Drug use: Yes Types: Benzodiazepines Comment: uses meds to help anxiety and bi polar disorder  Sexual activity: Not Currently Lifestyle  Physical activity:  
  Days per week: Not on file Minutes per session: Not on file  Stress: Not on file Relationships  Social connections:  
  Talks on phone: Not on file Gets together: Not on file Attends [employer] service: Not on file Active member of club or organization: Not on file Attends meetings of clubs or organizations: Not on file Relationship status: Not on file  Intimate partner violence:  
  Fear of current or ex partner: Not on file Emotionally abused: Not on file Physically abused: Not on file Forced sexual activity: Not on file Other Topics Concern  Not on file Social History Narrative  Not on file Allergies Allergen Reactions  Codeine Other (comments) Gets severe headache from codeine  Penicillin G Rash Current Outpatient Medications Medication Sig  
 metFORMIN ER (GLUCOPHAGE XR) 500 mg tablet TK 1 T PO HS  
 atorvastatin (LIPITOR) 10 mg tablet TK 1 T PO HS  
 pramipexole (MIRAPEX) 1 mg tablet three (3) times daily.  FLUoxetine (PROZAC) 40 mg capsule  methylphenidate ER 54 mg 24 hr tab TK 1 T PO QAM FOR ADHD  ARIPiprazole (ABILIFY) 15 mg tablet  traZODone (DESYREL) 100 mg tablet  cetirizine (ZYRTEC) 10 mg tablet TK 1 T PO D  
 MUCINEX 1,200 mg Ta12 ER tablet TK 1 T PO D  
 atorvastatin (LIPITOR) 20 mg tablet Take 20 mg by mouth daily.  pramipexole (MIRAPEX) 0.125 mg tablet Take 0.125 mg by mouth three (3) times daily.  levothyroxine (SYNTHROID) 100 mcg tablet Take 100 mcg by mouth Daily (before breakfast).  FLUoxetine (PROZAC) 20 mg capsule Take 20 mg by mouth daily.  methylphenidate (RITALIN) 10 mg tablet Take 10 mg by mouth three (3) times daily.  oxyCODONE-acetaminophen (PERCOCET) 5-325 mg per tablet Take 1 Tab by mouth every six (6) hours as needed for Pain. Max Daily Amount: 4 Tabs. Indications: Pain  
 azithromycin (ZITHROMAX) 250 mg tablet Take 250 mg by mouth daily.  HYDROcodone-acetaminophen (NORCO) 7.5-325 mg per tablet Take 1 Tab by mouth every six (6) hours as needed for Pain. Max Daily Amount: 4 Tabs. Indications: PAIN, 12/21/16 Left trigger finger release  methylphenidate (RITALIN LA) 20 mg SR capsule Take 20 mg by mouth three (3) times daily. No current facility-administered medications for this visit. PHYSICAL EXAM: 
Visit Vitals /60 Pulse 94 Temp 96.1 °F (35.6 °C) (Oral) Resp 16 Ht 5' 1\" (1.549 m) Wt 171 lb 3.2 oz (77.7 kg) SpO2 96% BMI 32.35 kg/m² Appearance: Alert, well appearing and pleasant patient who is in no distress, oriented to person, place/time, and who follows commands. HEENT: Billy Betancourt hears well, does not require hearing aids. Her sclera of the eyes are non-icteric. She is breathing normally and no respiratory accessory muscle use is noted. No JVD present and Neck ROM within normal limits. Psychiatric: Affect and mood are appropriate. Oriented x3 Heart[de-identified]  S1, S2 without murmer, regular rhythm Lungs:  Breath sounds clear to auscultation Cardiovascular/Peripheral Vascular: Normal pulses to each foot. Integumentary: No rashes,  wounds, or abrasions. Warm and normal color. No drainage. Gait: normal 
Sensory Exam: Intact/Normal Sensation Lymphatic: No evidence of Lymphedema Vascular:      
Pulses: palpable Varicosities none Wounds/Abrasion: None Present Neuro: Negative, no tremors ORTHO EXAMINATION: 
Examination Right Hand Left Hand Skin Well healed right thumb trigger release incision  Well healed ring finger trigger release incision Deformity - - Swelling -  - Tenderness + right ring finger A1 Pulley  - Finger flexion Full Full Finger extension Full Full Sensation Normal Normal  
Capillary refill Normal Normal  
Heberden's nodes - -  
Dupuytren's - - IMPRESSION:   
  ICD-10-CM ICD-9-CM 1. Trigger finger, right ring finger M65.341 727.03   
2. Finger pain, right M79.644 729.5 PLAN:  She will be scheduled for right ring trigger finger release. Risks of surgery outlined and informed consent obtained. She will follow up for H&P. Scribed by [facility] ([address]) as dictated by Carmen Perez MD

## 2019-05-11 ENCOUNTER — HOSPITAL ENCOUNTER (OUTPATIENT)
Dept: LAB | Age: 59
Discharge: HOME | End: 2019-05-11
Payer: COMMERCIAL

## 2019-05-11 ENCOUNTER — HOSPITAL ENCOUNTER (OUTPATIENT)
Dept: GENERAL RADIOLOGY | Age: 59
Discharge: HOME | End: 2019-05-11
Payer: COMMERCIAL

## 2019-05-11 DIAGNOSIS — Z01.811 PRE-OPERATIVE RESPIRATORY EXAMINATION: ICD-10-CM

## 2019-05-11 DIAGNOSIS — Z01.810 PRE-OPERATIVE CARDIOVASCULAR EXAMINATION: ICD-10-CM

## 2019-05-11 DIAGNOSIS — M79.644 FINGER PAIN, RIGHT: ICD-10-CM

## 2019-05-11 DIAGNOSIS — M65.341 TRIGGER FINGER, RIGHT RING FINGER: ICD-10-CM

## 2019-05-11 LAB
ATRIAL RATE: 80 BPM
CALCULATED P AXIS, ECG09: 53 DEGREES
CALCULATED R AXIS, ECG10: 52 DEGREES
CALCULATED T AXIS, ECG11: 47 DEGREES
DIAGNOSIS, 93000: NORMAL
P-R INTERVAL, ECG05: 176 MS
Q-T INTERVAL, ECG07: 386 MS
QRS DURATION, ECG06: 78 MS
QTC CALCULATION (BEZET), ECG08: 445 MS
VENTRICULAR RATE, ECG03: 80 BPM
XX-LABCORP SPECIMEN COL,LCBCF: NORMAL

## 2019-05-11 PROCEDURE — 99001 SPECIMEN HANDLING PT-LAB: CPT

## 2019-05-11 PROCEDURE — 71046 X-RAY EXAM CHEST 2 VIEWS: CPT

## 2019-05-11 PROCEDURE — 93005 ELECTROCARDIOGRAM TRACING: CPT

## 2019-05-14 ENCOUNTER — OFFICE VISIT (OUTPATIENT)
Dept: ORTHOPEDIC SURGERY | Facility: CLINIC | Age: 59
End: 2019-05-14

## 2019-05-14 VITALS
HEIGHT: 61 IN | TEMPERATURE: 96.1 F | BODY MASS INDEX: 32.36 KG/M2 | WEIGHT: 171.4 LBS | HEART RATE: 82 BPM | OXYGEN SATURATION: 98 % | SYSTOLIC BLOOD PRESSURE: 133 MMHG | DIASTOLIC BLOOD PRESSURE: 70 MMHG | RESPIRATION RATE: 16 BRPM

## 2019-05-14 DIAGNOSIS — G89.18 ACUTE POST-OPERATIVE PAIN: Primary | ICD-10-CM

## 2019-05-14 DIAGNOSIS — Z01.818 PREOP GENERAL PHYSICAL EXAM: ICD-10-CM

## 2019-05-14 RX ORDER — OXYCODONE AND ACETAMINOPHEN 5; 325 MG/1; MG/1
1 TABLET ORAL
Qty: 21 TAB | Refills: 0 | Status: SHIPPED | OUTPATIENT
Start: 2019-05-14 | End: 2019-05-21

## 2019-05-14 NOTE — PROGRESS NOTES
[facility] Labor returns for history and physical in preparation for upcoming right ring finger open trigger finger release. Please see attached forms in EPIC for History and Physical and Review of Systems.

## 2019-05-14 NOTE — H&P
History and Physical    70-year-old obese  female seen in preparation for her right hand open trigger release of the ring finger. Review of Systems   Constitutional: Positive for activity change (right hand triggering ring finger). Negative for fatigue and fever. HENT: Negative for ear pain. Eyes: Negative for pain. Respiratory: Negative for shortness of breath. Cardiovascular: Negative. Gastrointestinal: Negative. Endocrine: Negative. Genitourinary: Negative for flank pain. Musculoskeletal: Positive for arthralgias, joint swelling and myalgias. Skin: Negative. Allergic/Immunologic: Negative. Neurological: Negative. Hematological: Negative. Psychiatric/Behavioral: Negative. Physical Exam   Nursing note and vitals reviewed. Constitutional: She is oriented to person, place, and time. She appears well-developed and well-nourished. HENT:   Head: Normocephalic and atraumatic. Eyes: Pupils are equal, round, and reactive to light. Neck: Normal range of motion. Cardiovascular: Normal rate, regular rhythm, normal heart sounds and intact distal pulses. Pulmonary/Chest: Effort normal and breath sounds normal.   Musculoskeletal:        Hands:  Neurological: She is alert and oriented to person, place, and time. Skin: Skin is warm and dry. Psychiatric: She has a normal mood and affect. Her behavior is normal. Judgment and thought content normal.     Right hand ring finger triggering    Right hand open ring finger trigger release    Risk / Benefit: Surgeon will discuss in \"face to face\" encounter on day of surgery. Family History and Surgical History reviewed, discussed in detail, and deemed Non-Contributory in preparation for this surgical encounter.

## 2019-05-17 ENCOUNTER — OFFICE VISIT (OUTPATIENT)
Dept: ORTHOPEDIC SURGERY | Facility: CLINIC | Age: 59
End: 2019-05-17

## 2019-05-17 VITALS
OXYGEN SATURATION: 98 % | HEART RATE: 66 BPM | SYSTOLIC BLOOD PRESSURE: 109 MMHG | WEIGHT: 168.8 LBS | RESPIRATION RATE: 18 BRPM | BODY MASS INDEX: 31.87 KG/M2 | HEIGHT: 61 IN | DIASTOLIC BLOOD PRESSURE: 60 MMHG | TEMPERATURE: 95.2 F

## 2019-05-17 DIAGNOSIS — Z98.890 S/P TRIGGER FINGER RELEASE: ICD-10-CM

## 2019-05-17 DIAGNOSIS — G89.18 ACUTE POST-OPERATIVE PAIN: Primary | ICD-10-CM

## 2019-05-17 NOTE — LETTER
NOTIFICATION RETURN TO WORK / SCHOOL 
 
5/17/2019 9:15 AM 
 
Ms. Vinay Rodriguez [address] [place] [address] To Whom It May Concern: 
 
Vinay Rodriguez is currently under the care of [address] [place] [address]. She is released to return to work, full duty with no restrictions on 5/20/2019. If there are questions or concerns please have the patient contact our office.  
 
 
 
Sincerely, 
 
 
Samina Galvan PA-C

## 2019-05-17 NOTE — PROGRESS NOTES
HISTORY OF PRESENT ILLNESS:  Norris Ruiz returns to the office. She is two days status post her right hand open ring finger trigger release. She is doing well today. She has not used any pain medication to date. PHYSICAL EXAM:  The surgical site is exposed today over the palmar aspect of the right hand associated 2 centimeters proximal from the ring finger metacarpophalangeal joint and measuring in width 2.5 centimeters. Wound borders are well-approximated. There are interrupted vertical mattress sutures. There is no evidence of wound breakdown or dehiscence. There is no maceration noted. There is dried, bloody drainage noted throughout. In addition, methylene blue marker staining is also noted. The patient does have guarded flexion of the right ring finger at the MCP joint, 3-centimeters palm to pulp deficit on flexion. At the IP joint, there is a 20ø flexion contracture that passively can be improved to 10ø. The patient's distal sensation is intact to the right upper extremity. PLAN:   At this point, the patient is going to change her Band-Aid daily. She is going to avoid getting the surgical site wet. We will see her back in about one week for wound check and likely suture removal.  Today, she was given supplies to change her bandage over the next several days. Also, she was given a note to return to work full duty on Monday, May 20, 2019.

## 2019-05-24 ENCOUNTER — OFFICE VISIT (OUTPATIENT)
Dept: ORTHOPEDIC SURGERY | Facility: CLINIC | Age: 59
End: 2019-05-24

## 2019-05-24 VITALS
BODY MASS INDEX: 32.28 KG/M2 | HEIGHT: 61 IN | SYSTOLIC BLOOD PRESSURE: 130 MMHG | WEIGHT: 171 LBS | HEART RATE: 84 BPM | TEMPERATURE: 97.8 F | OXYGEN SATURATION: 98 % | DIASTOLIC BLOOD PRESSURE: 67 MMHG

## 2019-05-24 DIAGNOSIS — Z48.89 ENCOUNTER FOR POSTOPERATIVE WOUND CHECK: ICD-10-CM

## 2019-05-24 DIAGNOSIS — Z48.02 VISIT FOR SUTURE REMOVAL: ICD-10-CM

## 2019-05-24 DIAGNOSIS — Z98.890 S/P TRIGGER FINGER RELEASE: Primary | ICD-10-CM

## 2019-05-24 NOTE — PROGRESS NOTES
HISTORY:  The patient returns 9 days status post her right hand ring finger open trigger release. Her surgical site has matured nicely over the past week. She has no triggering associated with the ring finger. PROCEDURE:  The surgical site identified today and, using an 11-blade and clean technique with a needle rudolph, all sutures were removed and no complications. A sterile dressing was placed. PLAN:  The patient may wash the area with a clean soapy hand and non-caustic soap. She will not soak the hand. She will change her bandage daily. She is to avoid any dirt or oils. We did put some Neosporin on the hand today due to the dry portion of the surgical site in the center of the surgical complex. There was subcutaneous tissue noted at the center of the surgical site which formed a solid foundation with no gapping or underlying tissue visible. The patient will return in a week. All of her questions were answered to her satisfaction.

## 2019-05-30 ENCOUNTER — OFFICE VISIT (OUTPATIENT)
Dept: ORTHOPEDIC SURGERY | Facility: CLINIC | Age: 59
End: 2019-05-30

## 2019-05-30 VITALS
DIASTOLIC BLOOD PRESSURE: 64 MMHG | TEMPERATURE: 97.8 F | HEIGHT: 61 IN | BODY MASS INDEX: 32.32 KG/M2 | OXYGEN SATURATION: 96 % | SYSTOLIC BLOOD PRESSURE: 120 MMHG | HEART RATE: 91 BPM | RESPIRATION RATE: 16 BRPM | WEIGHT: 171.2 LBS

## 2019-05-30 DIAGNOSIS — Z48.89 ENCOUNTER FOR POSTOPERATIVE WOUND CHECK: ICD-10-CM

## 2019-05-30 DIAGNOSIS — Z98.890 S/P TRIGGER FINGER RELEASE: Primary | ICD-10-CM

## 2019-05-30 NOTE — PROGRESS NOTES
HISTORY OF PRESENT ILLNESS:  Nae Tuttle returns to the office for wound check associated with her recent surgical trigger finger release. PHYSICAL EXAM:  The wound site has matured nicely. There is granulation tissue noted in the subcutaneous aspect of the opening associated at the surgical site. This represents an improvement from prior observation and inspection. The patient has no active or passive triggering of any digit of the left hand. PLAN:   At this point, we are going to plan on seeing her back on a prn basis. She may wash the site normally but avoid any prolonged soaking for the next three to five days. No bracing or splinting is needed at this time. All her questions were answered to her satisfaction.

## 2019-08-31 DIAGNOSIS — M79.644 FINGER PAIN, RIGHT: ICD-10-CM

## 2019-08-31 DIAGNOSIS — Z01.812 BLOOD TESTS PRIOR TO TREATMENT OR PROCEDURE: ICD-10-CM

## 2019-08-31 DIAGNOSIS — M65.341 TRIGGER FINGER, RIGHT RING FINGER: ICD-10-CM

## 2022-06-22 ENCOUNTER — TRANSCRIBE ORDER (OUTPATIENT)
Dept: SCHEDULING | Age: 62
End: 2022-06-22

## 2022-06-22 DIAGNOSIS — Z12.31 BREAST CANCER SCREENING BY MAMMOGRAM: Primary | ICD-10-CM

## 2023-01-31 DIAGNOSIS — Z12.31 BREAST CANCER SCREENING BY MAMMOGRAM: Primary | ICD-10-CM

## 2023-02-03 DIAGNOSIS — Z12.31 BREAST CANCER SCREENING BY MAMMOGRAM: Primary | ICD-10-CM

## 2024-02-19 ENCOUNTER — OFFICE VISIT (OUTPATIENT)
Age: 64
End: 2024-02-19
Payer: COMMERCIAL

## 2024-02-19 VITALS
BODY MASS INDEX: 31.15 KG/M2 | WEIGHT: 165 LBS | HEIGHT: 61 IN | OXYGEN SATURATION: 95 % | RESPIRATION RATE: 18 BRPM | TEMPERATURE: 98 F | DIASTOLIC BLOOD PRESSURE: 69 MMHG | SYSTOLIC BLOOD PRESSURE: 127 MMHG | HEART RATE: 97 BPM

## 2024-02-19 DIAGNOSIS — E11.9 DIABETES MELLITUS TYPE II, NON INSULIN DEPENDENT (HCC): ICD-10-CM

## 2024-02-19 DIAGNOSIS — G47.19 EXCESSIVE DAYTIME SLEEPINESS: ICD-10-CM

## 2024-02-19 DIAGNOSIS — F32.1 CURRENT MODERATE EPISODE OF MAJOR DEPRESSIVE DISORDER, UNSPECIFIED WHETHER RECURRENT (HCC): ICD-10-CM

## 2024-02-19 DIAGNOSIS — R29.818 SUSPECTED SLEEP APNEA: Primary | ICD-10-CM

## 2024-02-19 DIAGNOSIS — G25.81 RESTLESS LEGS SYNDROME (RLS): ICD-10-CM

## 2024-02-19 DIAGNOSIS — G47.63 BRUXISM, SLEEP-RELATED: ICD-10-CM

## 2024-02-19 DIAGNOSIS — R06.83 LOUD SNORING: ICD-10-CM

## 2024-02-19 DIAGNOSIS — R06.02 WAKING AT NIGHT SHORT OF BREATH: ICD-10-CM

## 2024-02-19 DIAGNOSIS — G47.8 NON-RESTORATIVE SLEEP: ICD-10-CM

## 2024-02-19 PROCEDURE — 99204 OFFICE O/P NEW MOD 45 MIN: CPT | Performed by: OTOLARYNGOLOGY

## 2024-02-19 RX ORDER — METHYLPHENIDATE HYDROCHLORIDE 20 MG/1
20 CAPSULE, EXTENDED RELEASE ORAL 3 TIMES DAILY
COMMUNITY

## 2024-02-19 RX ORDER — PRAMIPEXOLE DIHYDROCHLORIDE 0.12 MG/1
1.5 TABLET ORAL 3 TIMES DAILY
COMMUNITY

## 2024-02-19 RX ORDER — IBUPROFEN 800 MG/1
TABLET ORAL
COMMUNITY
Start: 2011-12-19

## 2024-02-19 RX ORDER — LEVOTHYROXINE SODIUM 0.12 MG/1
TABLET ORAL
COMMUNITY
Start: 2011-12-19

## 2024-02-19 RX ORDER — CETIRIZINE HYDROCHLORIDE 10 MG/1
1 TABLET ORAL DAILY
COMMUNITY
Start: 2016-10-17

## 2024-02-19 RX ORDER — ARIPIPRAZOLE 15 MG/1
TABLET ORAL
COMMUNITY
Start: 2011-12-19

## 2024-02-19 RX ORDER — FLUOXETINE HYDROCHLORIDE 20 MG/1
20 CAPSULE ORAL DAILY
COMMUNITY

## 2024-02-19 RX ORDER — BLOOD SUGAR DIAGNOSTIC
STRIP MISCELLANEOUS
COMMUNITY
Start: 2024-01-26

## 2024-02-19 RX ORDER — ATORVASTATIN CALCIUM 10 MG/1
20 TABLET, FILM COATED ORAL 2 TIMES DAILY
COMMUNITY
Start: 2016-11-26

## 2024-02-19 RX ORDER — FLUOXETINE HYDROCHLORIDE 40 MG/1
1 CAPSULE ORAL
COMMUNITY
Start: 2011-12-19

## 2024-02-19 ASSESSMENT — PATIENT HEALTH QUESTIONNAIRE - PHQ9
SUM OF ALL RESPONSES TO PHQ QUESTIONS 1-9: 2
2. FEELING DOWN, DEPRESSED OR HOPELESS: 2
SUM OF ALL RESPONSES TO PHQ QUESTIONS 1-9: 2

## 2024-02-19 ASSESSMENT — SLEEP AND FATIGUE QUESTIONNAIRES
NECK CIRCUMFERENCE (INCHES): 14
ESS TOTAL SCORE: 12
HOW LIKELY ARE YOU TO NOD OFF OR FALL ASLEEP WHEN YOU ARE A PASSENGER IN A CAR FOR AN HOUR WITHOUT A BREAK: 2
HOW LIKELY ARE YOU TO NOD OFF OR FALL ASLEEP WHILE SITTING AND READING: 2
HOW LIKELY ARE YOU TO NOD OFF OR FALL ASLEEP WHILE WATCHING TV: 2
HOW LIKELY ARE YOU TO NOD OFF OR FALL ASLEEP WHILE LYING DOWN TO REST IN THE AFTERNOON WHEN CIRCUMSTANCES PERMIT: 3
HOW LIKELY ARE YOU TO NOD OFF OR FALL ASLEEP WHILE SITTING INACTIVE IN A PUBLIC PLACE: 1
HOW LIKELY ARE YOU TO NOD OFF OR FALL ASLEEP WHILE SITTING AND TALKING TO SOMEONE: 0
HOW LIKELY ARE YOU TO NOD OFF OR FALL ASLEEP WHILE SITTING QUIETLY AFTER LUNCH WITHOUT ALCOHOL: 2
HOW LIKELY ARE YOU TO NOD OFF OR FALL ASLEEP IN A CAR, WHILE STOPPED FOR A FEW MINUTES IN TRAFFIC: 0

## 2024-02-19 NOTE — ASSESSMENT & PLAN NOTE
Unclear control, continue current medications, no labs to review today still unclear what degree of prediabetes or type 2 diabetes she has.  Needs to continue her Glucophage as previously prescribed.

## 2024-02-19 NOTE — ASSESSMENT & PLAN NOTE
Borderline controlled, continue current medications , some daytime symptoms occasionally.  Has been on the same dose of Mirapex for quite some time.  Could have some augmentation and certainly SSRIs and possibly Ritalin can make this worse in some patients.  Also, untreated obstructive sleep apnea likely is not helping either.

## 2024-02-19 NOTE — PATIENT INSTRUCTIONS
device     Safety is strongly encouraged.  You should not drive if sleepy, tired, distracted and/or fatigued.      Chest Xrays and blood work do not require appointments.  They are considered \"Walk-In\" services and can be obtained at either [facility] or [facility].  Blood work is performed at the Laboratory (Lab) from 08:00am - 04:00 pm (if applicable to your visit)

## 2024-02-19 NOTE — PROGRESS NOTES
[facility]   Sleep Medicine     Office Progress Note - Initial Evaluation        [address]  [address]  [facility] [address] [phone] Fax    Reason for visit/referral: Evaluation for possible obstructive sleep apnea/sleep disordered breathing  Assessment:      1. Suspected sleep apnea  -     PAT - Home Sleep Test; Future  2. Loud snoring  -     PAT - Home Sleep Test; Future  3. Excessive daytime sleepiness  Comments:  Multifactorial at this point.  4. Bruxism, sleep-related  5. Non-restorative sleep  6. Waking at night short of breath  7. Restless legs syndrome (RLS)  Assessment & Plan:   Borderline controlled, continue current medications , some daytime symptoms occasionally.  Has been on the same dose of Mirapex for quite some time.  Could have some augmentation and certainly SSRIs and possibly Ritalin can make this worse in some patients.  Also, untreated obstructive sleep apnea likely is not helping either.  8. Current moderate episode of major depressive disorder, unspecified whether recurrent (HCC)  Assessment & Plan:   Monitored by specialist- no acute findings meriting change in the plan, managed by Dr. Hong Joshua. Taking several medications. Relatively stable currently.  9. Diabetes mellitus type II, non insulin dependent (HCC)  Assessment & Plan:   Unclear control, continue current medications, no labs to review today still unclear what degree of prediabetes or type 2 diabetes she has.  Needs to continue her Glucophage as previously prescribed.     Patient has a complicated medical history as she was diagnosed with narcolepsy 15 years ago.  It sounds as if this is been treated only with Ritalin over the years.  Her psychiatrist is managing this.  Currently, she is on 20 mg 3 times daily for this.  She is still excessively sleepy during the day and has to nap when she gets home from work.    Complicating this is also a history of restless legs, major

## 2024-02-19 NOTE — ASSESSMENT & PLAN NOTE
Monitored by specialist- no acute findings meriting change in the plan, managed by Dr. Hong Joshua. Taking several medications.

## 2024-02-19 NOTE — PROGRESS NOTES
Melirafa Mckeon presents today for   Chief Complaint   Patient presents with    Snoring    Sleep Problem       Is someone accompanying this pt? no    Is the patient using any DME equipment during OV? no    -DME Company: N/A    Have you ever had a sleep study done before? Yes    Depression Screenin/19/2024     3:08 PM   PHQ-9    Feeling down, depressed, or hopeless 2   PHQ-9 Total Score 2        [place] Sleepiness Scale:      2024     3:14 PM   Sleep Medicine   Sitting and reading 2   Watching TV 2   Sitting, inactive in a public place (e.g. a theatre or a meeting) 1   As a passenger in a car for an hour without a break 2   Lying down to rest in the afternoon when circumstances permit 3   Sitting and talking to someone 0   Sitting quietly after a lunch without alcohol 2   In a car, while stopped for a few minutes in traffic 0   [place] Sleepiness Score 12   Neck circumference (Inches) 14       Stop-Ban/19/2024     3:00 PM   STOP-BANG QUESTIONNAIRE   Are you a loud and/or regular snorer? 1   Do you often feel tired or groggy upon awakening or do you awaken with a headache? 1   Have you been observed to gasp or stop breathing during sleep? 0   Are you often tired or fatigued during wake time hours?  0   Do you fall asleep sitting, reading, watching TV or driving? 0   Do you often have problems with memory or concentration? 0   Do you have or are you being treated for high blood pressure? 1   Recent BMI (Calculated) 0   Age older than 50 years old? 1=Yes   Is your neck circumference greater than 17 inches (Male) or 16 inches (Female)? 0   Gender - Male 0=No         Coordination of Care:  1. Have you been to the ER, urgent care clinic since your last visit? Hospitalized since your last visit? no    2. Have you seen or consulted any other health care providers outside of the [facility] since your last visit? Include any pap smears or colon screening. no    Medication list has been

## 2024-03-06 ENCOUNTER — HOSPITAL ENCOUNTER (OUTPATIENT)
Dept: SLEEP MEDICINE | Facility: HOSPITAL | Age: 64
Discharge: HOME | End: 2024-03-09
Attending: OTOLARYNGOLOGY
Payer: COMMERCIAL

## 2024-03-06 DIAGNOSIS — R29.818 SUSPECTED SLEEP APNEA: ICD-10-CM

## 2024-03-06 DIAGNOSIS — R06.83 LOUD SNORING: ICD-10-CM

## 2024-03-06 PROCEDURE — 95800 SLP STDY UNATTENDED: CPT

## 2024-03-11 PROBLEM — R06.83 LOUD SNORING: Status: ACTIVE | Noted: 2024-03-11

## 2024-03-21 ENCOUNTER — TELEPHONE (OUTPATIENT)
Age: 64
End: 2024-03-21

## 2025-04-07 ENCOUNTER — OFFICE VISIT (OUTPATIENT)
Facility: CLINIC | Age: 65
End: 2025-04-07
Payer: COMMERCIAL

## 2025-04-07 VITALS
BODY MASS INDEX: 31.34 KG/M2 | SYSTOLIC BLOOD PRESSURE: 139 MMHG | WEIGHT: 166 LBS | TEMPERATURE: 97.1 F | HEIGHT: 61 IN | OXYGEN SATURATION: 98 % | DIASTOLIC BLOOD PRESSURE: 89 MMHG | RESPIRATION RATE: 16 BRPM | HEART RATE: 87 BPM

## 2025-04-07 DIAGNOSIS — E03.9 ACQUIRED HYPOTHYROIDISM: ICD-10-CM

## 2025-04-07 DIAGNOSIS — G25.81 RESTLESS LEG SYNDROME: ICD-10-CM

## 2025-04-07 DIAGNOSIS — M79.671 FOOT PAIN, RIGHT: ICD-10-CM

## 2025-04-07 DIAGNOSIS — E11.9 DIABETES MELLITUS TYPE II, NON INSULIN DEPENDENT (HCC): Primary | ICD-10-CM

## 2025-04-07 DIAGNOSIS — F98.8 ADD (ATTENTION DEFICIT DISORDER) WITHOUT HYPERACTIVITY: ICD-10-CM

## 2025-04-07 LAB
GLUCOSE, POC: 354 MG/DL
HBA1C MFR BLD: 11.5 %

## 2025-04-07 PROCEDURE — 3046F HEMOGLOBIN A1C LEVEL >9.0%: CPT | Performed by: INTERNAL MEDICINE

## 2025-04-07 PROCEDURE — 82962 GLUCOSE BLOOD TEST: CPT | Performed by: INTERNAL MEDICINE

## 2025-04-07 PROCEDURE — 1123F ACP DISCUSS/DSCN MKR DOCD: CPT | Performed by: INTERNAL MEDICINE

## 2025-04-07 PROCEDURE — 99204 OFFICE O/P NEW MOD 45 MIN: CPT | Performed by: INTERNAL MEDICINE

## 2025-04-07 PROCEDURE — 83036 HEMOGLOBIN GLYCOSYLATED A1C: CPT | Performed by: INTERNAL MEDICINE

## 2025-04-07 RX ORDER — PRAMIPEXOLE DIHYDROCHLORIDE 1.5 MG/1
1.5 TABLET ORAL 3 TIMES DAILY
COMMUNITY
End: 2025-04-07 | Stop reason: SDUPTHER

## 2025-04-07 RX ORDER — METHYLPHENIDATE HYDROCHLORIDE 54 MG/1
54 TABLET, EXTENDED RELEASE ORAL EVERY MORNING
COMMUNITY

## 2025-04-07 RX ORDER — METHYLPHENIDATE HYDROCHLORIDE 10 MG/1
10 TABLET ORAL 3 TIMES DAILY
COMMUNITY

## 2025-04-07 RX ORDER — ATORVASTATIN CALCIUM 40 MG/1
40 TABLET, FILM COATED ORAL DAILY
Qty: 30 TABLET | Refills: 2 | Status: SHIPPED | OUTPATIENT
Start: 2025-04-07

## 2025-04-07 RX ORDER — ATORVASTATIN CALCIUM 40 MG/1
40 TABLET, FILM COATED ORAL DAILY
COMMUNITY
End: 2025-04-07 | Stop reason: SDUPTHER

## 2025-04-07 RX ORDER — LEVOTHYROXINE SODIUM 88 UG/1
88 TABLET ORAL DAILY
COMMUNITY

## 2025-04-07 RX ORDER — PRAMIPEXOLE DIHYDROCHLORIDE 1.5 MG/1
1.5 TABLET ORAL 3 TIMES DAILY
Qty: 90 TABLET | Refills: 2 | Status: SHIPPED | OUTPATIENT
Start: 2025-04-07

## 2025-04-07 RX ORDER — BLOOD SUGAR DIAGNOSTIC
1 STRIP MISCELLANEOUS DAILY
Qty: 100 EACH | Refills: 3 | Status: SHIPPED | OUTPATIENT
Start: 2025-04-07

## 2025-04-07 SDOH — ECONOMIC STABILITY: FOOD INSECURITY: WITHIN THE PAST 12 MONTHS, THE FOOD YOU BOUGHT JUST DIDN'T LAST AND YOU DIDN'T HAVE MONEY TO GET MORE.: NEVER TRUE

## 2025-04-07 SDOH — ECONOMIC STABILITY: FOOD INSECURITY: WITHIN THE PAST 12 MONTHS, YOU WORRIED THAT YOUR FOOD WOULD RUN OUT BEFORE YOU GOT MONEY TO BUY MORE.: NEVER TRUE

## 2025-04-07 ASSESSMENT — PATIENT HEALTH QUESTIONNAIRE - PHQ9
SUM OF ALL RESPONSES TO PHQ QUESTIONS 1-9: 0
2. FEELING DOWN, DEPRESSED OR HOPELESS: NOT AT ALL
6. FEELING BAD ABOUT YOURSELF - OR THAT YOU ARE A FAILURE OR HAVE LET YOURSELF OR YOUR FAMILY DOWN: NOT AT ALL
8. MOVING OR SPEAKING SO SLOWLY THAT OTHER PEOPLE COULD HAVE NOTICED. OR THE OPPOSITE, BEING SO FIGETY OR RESTLESS THAT YOU HAVE BEEN MOVING AROUND A LOT MORE THAN USUAL: NOT AT ALL
SUM OF ALL RESPONSES TO PHQ QUESTIONS 1-9: 0
4. FEELING TIRED OR HAVING LITTLE ENERGY: NOT AT ALL
5. POOR APPETITE OR OVEREATING: NOT AT ALL
7. TROUBLE CONCENTRATING ON THINGS, SUCH AS READING THE NEWSPAPER OR WATCHING TELEVISION: NOT AT ALL
SUM OF ALL RESPONSES TO PHQ QUESTIONS 1-9: 0
9. THOUGHTS THAT YOU WOULD BE BETTER OFF DEAD, OR OF HURTING YOURSELF: NOT AT ALL
3. TROUBLE FALLING OR STAYING ASLEEP: NOT AT ALL
SUM OF ALL RESPONSES TO PHQ QUESTIONS 1-9: 0
1. LITTLE INTEREST OR PLEASURE IN DOING THINGS: NOT AT ALL

## 2025-04-07 ASSESSMENT — ENCOUNTER SYMPTOMS
COUGH: 0
SORE THROAT: 0
SHORTNESS OF BREATH: 0
ABDOMINAL DISTENTION: 0
ABDOMINAL PAIN: 0

## 2025-04-07 NOTE — PROGRESS NOTES
\"Have you been to the ER, urgent care clinic since your last visit?  Hospitalized since your last visit?\"    NO    “Have you seen or consulted any other health care providers outside our system since your last visit?”    NO    Have you had a mammogram?”   NO    Date of last Mammogram: 9/18/2018      “Have you had a pap smear?”    NO    No cervical cancer screening on file       “Have you had a colorectal cancer screening such as a colonoscopy/FIT/Cologuard?    NO    No colonoscopy on file  No cologuard on file  No FIT/FOBT on file   No flexible sigmoidoscopy on file     “Have you had a diabetic eye exam?”    NO     No diabetic eye exam on file            
    Worried About Running Out of Food in the Last Year: Never true     Ran Out of Food in the Last Year: Never true   Transportation Needs: No Transportation Needs (4/7/2025)    PRAPARE - Transportation     Lack of Transportation (Medical): No     Lack of Transportation (Non-Medical): No   Housing Stability: Low Risk  (4/7/2025)    Housing Stability Vital Sign     Unable to Pay for Housing in the Last Year: No     Number of Times Moved in the Last Year: 0     Homeless in the Last Year: No     Current Outpatient Medications   Medication Sig Dispense Refill    methylphenidate (RITALIN) 10 MG tablet Take 1 tablet by mouth 3 times daily. Max Daily Amount: 30 mg      Methylphenidate 54 MG CR tablet Take 1 tablet by mouth every morning. Max Daily Amount: 54 mg      levothyroxine (SYNTHROID) 88 MCG tablet Take 1 tablet by mouth Daily      blood glucose test strips (GLUCOSE METER TEST) strip 1 each by In Vitro route daily As needed. 100 each 3    empagliflozin (JARDIANCE) 10 MG tablet Take 1 tablet by mouth daily 90 tablet 1    pramipexole (MIRAPEX) 1.5 MG tablet Take 1 tablet by mouth 3 times daily 90 tablet 2    atorvastatin (LIPITOR) 40 MG tablet Take 1 tablet by mouth daily 30 tablet 2    metFORMIN (GLUCOPHAGE) 500 MG tablet Take 1 tablet by mouth 2 times daily (with meals) 180 tablet 0    TRAZODONE HCL ER PO Take 100 mg by mouth once      ARIPiprazole (ABILIFY) 15 MG tablet       FLUoxetine (PROZAC) 20 MG capsule Take 1 capsule by mouth daily      FLUoxetine (PROZAC) 40 MG capsule 1 capsule      ONETOUCH VERIO strip  (Patient not taking: Reported on 4/7/2025)       No current facility-administered medications for this visit.     Allergies   Allergen Reactions    Codeine Other (See Comments)     Gets severe headache from codeine    headache    Penicillins Hives and Rash     The patient has a family history of    REVIEW OF SYSTEMS  Review of Systems   Constitutional:  Negative for fatigue and fever.   HENT:  Negative for

## 2025-04-08 LAB — TSH SERPL DL<=0.005 MIU/L-ACNC: 1.86 UIU/ML (ref 0.45–4.5)

## 2025-04-14 ENCOUNTER — OFFICE VISIT (OUTPATIENT)
Age: 65
End: 2025-04-14
Payer: COMMERCIAL

## 2025-04-14 DIAGNOSIS — M79.89 SWELLING OF RIGHT FOOT: Primary | ICD-10-CM

## 2025-04-14 DIAGNOSIS — M20.41 HAMMER TOE OF RIGHT FOOT: ICD-10-CM

## 2025-04-14 DIAGNOSIS — M21.619 BUNION OF GREAT TOE: ICD-10-CM

## 2025-04-14 DIAGNOSIS — S99.921A INJURY OF PLANTAR PLATE OF RIGHT FOOT, INITIAL ENCOUNTER: ICD-10-CM

## 2025-04-14 PROCEDURE — 73630 X-RAY EXAM OF FOOT: CPT | Performed by: ORTHOPAEDIC SURGERY

## 2025-04-14 PROCEDURE — 1123F ACP DISCUSS/DSCN MKR DOCD: CPT | Performed by: ORTHOPAEDIC SURGERY

## 2025-04-14 PROCEDURE — 99203 OFFICE O/P NEW LOW 30 MIN: CPT | Performed by: ORTHOPAEDIC SURGERY

## 2025-04-14 NOTE — PROGRESS NOTES
Meli Mckeon   [address]  [facility] [address]       DIAGNOSTIC IMAGING      Orders Placed This Encounter   Procedures    [34581] Foot Min 3V        FOOT X RAYS 3 VIEWS Right   4/14/2025    RIGHT FOOT X-rays,  NON WEIGHT BEARING 3 views, AP/LAT/OBL completed  4/14/2025 AT [facility]     X-rays reveal Osseous: No fractures or dislocations. No focal osteolytic or osteoblastic process. Bone Spurs: No significant bone spurs. Overall alignment is severe OA right great toe first MTP with valgus alignment, varus alignment of right #2 toe crossover toe deformity is present. Soft tissue swelling is not noted, No radiopaque foreign body and No abnormal calcific densities to soft tissues. No osteolytic or osteoblastic lesions noted, no projection x-ray images. Mineralization suggests no osteopenia. Degenerative changes/Joint Condition: No Significant OA is not noted. Calcified vessels are not present.     I have personally reviewed the results of the above study and the interpretation of this study is my professional opinion     4/14/2025  3:54 PM       
History of  acid reflux, hypothyroidism, high cholesterol.    COMPARISON: 12/11/2017.    TECHNIQUE: PA and lateral chest radiographs are reviewed.    FINDINGS:    The lungs appear clear without  evidence of focal pulmonary infiltrate,  pulmonary edema or pleural effusion.  The cardiomediastinal contours are within  normal limits. No acute osseous abnormalities identified.    Impression  IMPRESSION:    No evidence of acute pulmonary disease.     No results found for this or any previous visit (from the past 4464 hours).      No results found for: \"WBC\", \"HGB\", \"HCT\", \"MCV\", \"PLT\", \"MID\", \"GRAN\", \"LYMPHOPCT\", \"MIDPERCENT\", \"GRANULOCYTES\", \"RBC\", \"MCH\", \"MCHC\", \"RDW\"  No results found for: \"LABA1C\",  No results found for: \"NA\", \"K\", \"CL\", \"CO2\", \"BUN\", \"CREATININE\", \"GLUCOSE\", \"CALCIUM\", \"LABALBU\", \"BILITOT\", \"ALKPHOS\", \"AST\", \"ALT\", \"LABGLOM\", \"GFRAA\", \"AGRATIO\", \"GLOB\"  No results found for: \"VITD25\" ,No results found for: \"INR\", \"PROTIME\", No results found for: \"APTT\"       REVIEW OF SYSTEMS : 4/14/2025  ALL BELOW ARE Negative except : SEE HPI     All other systems reviewed and are negative.     14 point review of systems otherwise negative unless noted in HPI.          I have spent  time reviewing the previous notes, reviewing diagnostic studies [Advanced  Imaging, Diagnostic test results (x-rays)] and had a direct face to face with the patient discussing the diagnosis and importance of compliance with the treatment and plan.  The treatment plan is listed in the above plan section of this Office encounter. I  answered all of her questions, as well as documenting patient care coordination for this individual on the day of the visit.    From AMA Steps Forward Documentation   re Medical Decision Making  MDM // AMA 2023  From  AAOS (E/M) Changes: What You need to know for 2021    E/M Coding: Each element (number of diagnoses, complexity of data, and risk (can be classified as straightforward, low, moderate, or high)

## 2025-04-21 ENCOUNTER — OFFICE VISIT (OUTPATIENT)
Age: 65
End: 2025-04-21
Payer: COMMERCIAL

## 2025-04-21 VITALS — BODY MASS INDEX: 30.96 KG/M2 | HEIGHT: 61 IN | WEIGHT: 164 LBS

## 2025-04-21 DIAGNOSIS — M53.3 PAIN OF RIGHT SACROILIAC JOINT: Primary | ICD-10-CM

## 2025-04-21 PROCEDURE — 73521 X-RAY EXAM HIPS BI 2 VIEWS: CPT | Performed by: ORTHOPAEDIC SURGERY

## 2025-04-21 PROCEDURE — 99213 OFFICE O/P EST LOW 20 MIN: CPT | Performed by: ORTHOPAEDIC SURGERY

## 2025-04-21 PROCEDURE — 1123F ACP DISCUSS/DSCN MKR DOCD: CPT | Performed by: ORTHOPAEDIC SURGERY

## 2025-04-21 RX ORDER — MELOXICAM 15 MG/1
15 TABLET ORAL DAILY
Qty: 30 TABLET | Refills: 2 | Status: SHIPPED | OUTPATIENT
Start: 2025-04-21 | End: 2025-07-20

## 2025-04-21 NOTE — PROGRESS NOTES
Patient: Meli Mckeon                MRN: [id number]       SSN: [id number]  YOB: 1960        AGE: 65 y.o.        SEX: female  BMI: Body mass index is 30.99 kg/m².    PCP: Dru Ball MD  04/21/25    Chief Complaint: Hip Pain (Right hip )      1. Pain of right sacroiliac joint  -     AMB POC X-RAY RADEX HIP UNI WITH PELVIS 2-3 VIEWS  -     [place] [facility] [place] [facility]  -     meloxicam (MOBIC) 15 MG tablet; Take 1 tablet by mouth daily, Disp-30 tablet, R-2Normal        HPI:  Meli Mckeon is a 65 y.o. female with chief complaint of   Chief Complaint   Patient presents with    Hip Pain     Right hip            4/14/2025     3:40 PM   AMB PAIN ASSESSMENT   Location of Pain Foot   Location Modifiers Right;Medial;Plantar   Severity of Pain 0     Allergies   Allergen Reactions    Codeine Other (See Comments)     Gets severe headache from codeine    headache    Penicillins Hives and Rash       New patient with right sided low back and hip pain that started about 6 weeks ago.  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  Within a week or so later she started getting some tingling and burning sensations in the anterior thigh.  This does not go all the way down to the knee and has never affected her feet.  She has not used any Tylenol or anti-inflammatories up to this point.    Known risk factors for perioperative complications:   Diabetes mellitus No results found for: \"LABA1C\", patient reports last A1c of 11.1  Obesity BMI < 40   BMI Readings from Last 1 Encounters:   04/21/25 30.99 kg/m²      Thyroid  Major depression  ADD      IMAGING:  Imaging read by myself and interpreted as follows:    April 21, 2025:  AP pelvis with AP of each hip and crosstable lateral of the right hip taken at the [facility] office.  Joint space of the right hip and left hip is well-preserved.  There is a small

## 2025-05-13 ENCOUNTER — TELEPHONE (OUTPATIENT)
Facility: HOSPITAL | Age: 65
End: 2025-05-13

## 2025-05-19 ENCOUNTER — HOSPITAL ENCOUNTER (OUTPATIENT)
Facility: HOSPITAL | Age: 65
Setting detail: RECURRING SERIES
Discharge: HOME | End: 2025-05-22
Attending: ORTHOPAEDIC SURGERY
Payer: COMMERCIAL

## 2025-05-19 PROCEDURE — 97161 PT EVAL LOW COMPLEX 20 MIN: CPT

## 2025-05-19 NOTE — PROGRESS NOTES
[] WNL  Myotome/Dermatome/Reflexes:  Comments:    Dural Mobility:  SLR Sitting: [] R    [] L    [] +    [] -  @ (degrees):           Supine: [] R    [] L    [] +    [] -  @ (degrees):   Slump Test: [] R    [] L    [] +    [] -  @ (degrees):   Prone Knee Bend: [] R    [] L    [] +    [] -     Palpation  [] Min  [x] Mod  [] Severe    Location:STC TTP R SIJ and glut  [] Min  [] Mod  [] Severe    Location:  [] Min  [] Mod  [] Severe    Location:    B LE AROM WFL       Strength   L(0-5) R (0-5) N/T   Hip Flexion (L1,2) 5 5 []   Knee Extension (L3,4) 5 5 []   Ankle Dorsiflexion (L4) 5 5 []   Great Toe Extension (L5)   []   Ankle Plantarflexion (S1)   []   Knee Flexion (S1,2)   []   Upper Abdominals   []   Lower Abdominals   []   Paraspinals   []   Back Rotators   []   Gluteus Chase   []   Abd/add 5/5 5/5 []     Special Tests  Lumbar:  Lumb. Compression: [] Pos  [] Neg               Lumbar Distraction:   [] Pos  [] Neg    Quadrant:  [] Pos  [] Neg   [] Flex  [] Ext    Sacroilliac:  Gaenslen's: [] R    [] L    [] +    [] -     Compression: [] +    [] -     Gapping:  [] +    [] -     Thigh Thrust: [] R    [] L    [] +    [] -     Leg Length: [] +    [] -   Position:    Crests:    ASIS:    PSIS:    Sacral Sulcus:    Mobility: Standing flex:     Sitting flex:     Supine to sit:     Prone knee bend:         Hip: Genvea:  [] R    [] L    [] +    [] -     Scour:  [] R    [] L    [] +    [] -     Piriformis: [x] R    [] L    [x] +    [] -          Deficits: Soni's: [] R    [] L    [] +    [] -     Adi: [] R    [] L    [] +    [] -     Hamstrings 90/90:    Gastrocsoleus (to neutral): Right: Left:       Global Muscular Weakness:  Abdominals:  Quadratus Lumborum:  Paraspinals:  Other:    Other tests/comments:        ASSESSMENT/Changes in Function: Patient demonstrates the potential to make gains with improved ROM, strength, endurance/activity tolerance, functional LEFS  survey score baseline 52 and all within a reasonable time

## 2025-05-19 NOTE — THERAPY EVALUATION
tolerance to ADLS and regular daily activities at home and in the community  EVAL 1  3 patient will have LEFS 61 to show increase tolerance and minimal projected gains with function at home and in the community  EVAL 52  4 patient will report overall 50% improvement to aid with increase tolerance to prolonged sitting at home  EVAL pain worse with prolonged sitting       Frequency / Duration: Patient to be seen 2 times per week for 4 WEEKS    Patient/ Caregiver education and instruction: Diagnosis, prognosis, self care, activity modification, and exercises [x]  Plan of care has been reviewed with PTA    Certification Period: non-Medicare: N/A    Darlyn Naidu, PT       5/19/2025       3:23 PM    [place]: [place] [employer] / Plan: [employer] [place] [employer] / Product Type: *No Product type* /     No Physician signature required; Signature on POC no longer required for Medicare as of 1/1/25

## 2025-05-22 ENCOUNTER — TELEPHONE (OUTPATIENT)
Facility: HOSPITAL | Age: 65
End: 2025-05-22

## 2025-05-22 NOTE — TELEPHONE ENCOUNTER
DARRION on 5/22/25 at 11:31am to schedule f/u visits due to her insurance auth being approved for 8 visits expiring on 7/17/25. 2 times a week.

## 2025-05-23 ENCOUNTER — OFFICE VISIT (OUTPATIENT)
Facility: CLINIC | Age: 65
End: 2025-05-23
Payer: COMMERCIAL

## 2025-05-23 VITALS
SYSTOLIC BLOOD PRESSURE: 133 MMHG | WEIGHT: 167.2 LBS | HEIGHT: 61 IN | DIASTOLIC BLOOD PRESSURE: 81 MMHG | RESPIRATION RATE: 14 BRPM | TEMPERATURE: 97.8 F | HEART RATE: 66 BPM | BODY MASS INDEX: 31.57 KG/M2 | OXYGEN SATURATION: 96 %

## 2025-05-23 DIAGNOSIS — L02.416 ABSCESS OF LEFT HIP: Primary | ICD-10-CM

## 2025-05-23 DIAGNOSIS — E11.9 DIABETES MELLITUS TYPE II, NON INSULIN DEPENDENT (HCC): ICD-10-CM

## 2025-05-23 LAB
GLUCOSE, POC: 189 MG/DL
HBA1C MFR BLD: 9.5 %

## 2025-05-23 PROCEDURE — 83036 HEMOGLOBIN GLYCOSYLATED A1C: CPT | Performed by: INTERNAL MEDICINE

## 2025-05-23 PROCEDURE — 3046F HEMOGLOBIN A1C LEVEL >9.0%: CPT | Performed by: INTERNAL MEDICINE

## 2025-05-23 PROCEDURE — 82962 GLUCOSE BLOOD TEST: CPT | Performed by: INTERNAL MEDICINE

## 2025-05-23 PROCEDURE — 99214 OFFICE O/P EST MOD 30 MIN: CPT | Performed by: INTERNAL MEDICINE

## 2025-05-23 PROCEDURE — 1123F ACP DISCUSS/DSCN MKR DOCD: CPT | Performed by: INTERNAL MEDICINE

## 2025-05-23 ASSESSMENT — PATIENT HEALTH QUESTIONNAIRE - PHQ9
SUM OF ALL RESPONSES TO PHQ QUESTIONS 1-9: 1
SUM OF ALL RESPONSES TO PHQ QUESTIONS 1-9: 1
1. LITTLE INTEREST OR PLEASURE IN DOING THINGS: SEVERAL DAYS
SUM OF ALL RESPONSES TO PHQ QUESTIONS 1-9: 1
SUM OF ALL RESPONSES TO PHQ QUESTIONS 1-9: 1
2. FEELING DOWN, DEPRESSED OR HOPELESS: NOT AT ALL

## 2025-05-23 NOTE — PROGRESS NOTES
Meli Mckeon is a 65 y.o. female (: 1960) presenting to address:    Chief Complaint   Patient presents with    Follow-up     Diabetes       There were no vitals filed for this visit.    \"Have you been to the ER, urgent care clinic since your last visit?  Hospitalized since your last visit?\"    NO    “Have you seen or consulted any other health care providers outside of [facility] since your last visit?”    NO    “Have you had a colorectal cancer screening such as a colonoscopy/FIT/Cologuard?    NO    No colonoscopy on file  No cologuard on file  No FIT/FOBT on file   No flexible sigmoidoscopy on file        Have you had a mammogram?”   NO    Date of last Mammogram: 2018      “Have you had a pap smear?”    NO    No cervical cancer screening on file

## 2025-06-05 ENCOUNTER — HOSPITAL ENCOUNTER (OUTPATIENT)
Age: 65
Discharge: HOME | End: 2025-06-08
Attending: SURGERY
Payer: COMMERCIAL

## 2025-06-05 ENCOUNTER — OFFICE VISIT (OUTPATIENT)
Age: 65
End: 2025-06-05
Payer: COMMERCIAL

## 2025-06-05 VITALS
HEIGHT: 61 IN | WEIGHT: 162 LBS | TEMPERATURE: 97 F | RESPIRATION RATE: 18 BRPM | DIASTOLIC BLOOD PRESSURE: 84 MMHG | SYSTOLIC BLOOD PRESSURE: 124 MMHG | HEART RATE: 84 BPM | OXYGEN SATURATION: 100 % | BODY MASS INDEX: 30.58 KG/M2

## 2025-06-05 DIAGNOSIS — R22.42 HIP MASS, LEFT: ICD-10-CM

## 2025-06-05 DIAGNOSIS — E11.9 DIABETES MELLITUS TYPE II, NON INSULIN DEPENDENT (HCC): ICD-10-CM

## 2025-06-05 DIAGNOSIS — R22.42 HIP MASS, LEFT: Primary | ICD-10-CM

## 2025-06-05 PROCEDURE — 3046F HEMOGLOBIN A1C LEVEL >9.0%: CPT | Performed by: SURGERY

## 2025-06-05 PROCEDURE — 1123F ACP DISCUSS/DSCN MKR DOCD: CPT | Performed by: SURGERY

## 2025-06-05 PROCEDURE — 99204 OFFICE O/P NEW MOD 45 MIN: CPT | Performed by: SURGERY

## 2025-06-05 PROCEDURE — 73700 CT LOWER EXTREMITY W/O DYE: CPT

## 2025-06-05 RX ORDER — DOXYCYCLINE HYCLATE 100 MG
100 TABLET ORAL 2 TIMES DAILY
Qty: 20 TABLET | Refills: 0 | Status: SHIPPED | OUTPATIENT
Start: 2025-06-05 | End: 2025-06-15

## 2025-06-05 NOTE — PROGRESS NOTES
General Surgery Consult    Meli Mckeon  Admit date: (Not on file)    MRN: [id number]     : 1960     Age: 65 y.o.        Attending Physician: Zonia Savage MD [facility]      History of Present Illness:      Meli Mckeon is a 65 y.o. female who was referred to me by Dr. Dru Ball for evaluation of left hip mass that is ulcerated and causing chronic bleeding.  The patient states that she had this lesion for more than a year and it was according to her possibly a cyst but it got slowly larger and ulcerated.  Over the last 2 to 3 months it has been oozing a little bit and over the last 4-week it got really larger.  She denies any fever or chills.  She denies any drainage from the lesion itself but there is some chronic bleeding from an ulcerated port at the tip of the mass.  According to the patient she was told that this represented a cyst.     Patient Active Problem List    Diagnosis Date Noted    Loud snoring 2024    Excessive daytime sleepiness 2024    Bruxism, sleep-related 2024    Restless legs syndrome (RLS) 2024    Current moderate episode of major depressive disorder (HCC) 2024    Diabetes mellitus type II, non insulin dependent (HCC) 2024     Past Medical History:   Diagnosis Date    Depression     Endocrine disease     hypothyroidism, high cholesterol    Gastrointestinal disorder     acid reflux    Hyperlipidemia     Hypothyroidism     Psychiatric disorder     bipolar disorder and anxiety    Restless leg syndrome     Type 2 diabetes mellitus without complication (HCC)       Past Surgical History:   Procedure Laterality Date    GYN      hysterectomy    HAND/FINGER SURGERY UNLISTED      several trigger finger    OTHER SURGICAL HISTORY Right     bunionectomy    UROLOGICAL SURGERY      bladder surgery      Social History     Tobacco Use    Smoking status: Never    Smokeless tobacco: Never   Substance Use Topics    Alcohol use: No      Social History

## 2025-06-18 ENCOUNTER — HOSPITAL ENCOUNTER (OUTPATIENT)
Facility: HOSPITAL | Age: 65
Setting detail: RECURRING SERIES
Discharge: HOME | End: 2025-06-21
Attending: ORTHOPAEDIC SURGERY
Payer: COMMERCIAL

## 2025-06-18 PROCEDURE — 97530 THERAPEUTIC ACTIVITIES: CPT

## 2025-06-18 PROCEDURE — 97112 NEUROMUSCULAR REEDUCATION: CPT

## 2025-06-18 PROCEDURE — 97140 MANUAL THERAPY 1/> REGIONS: CPT

## 2025-06-18 NOTE — PROGRESS NOTES
TIMED therapeutic procedures (example: do not include dry needle or estim unattended, both untimed codes, in totals to left)  8-22 min = 1 unit; 23-37 min = 2 units; 38-52 min = 3 units; 53-67 min = 4 units; 68-82 min = 5 units   Total Total     Charge Capture    [x]  Patient Education billed concurrently with other procedures   [x] Review HEP    [] Progressed/Changed HEP, detail:    [] Other detail:       Objective Information/Functional Measures/Assessment    Patient has only attended 2 total PT sessions and missed 0 since IE. Patient has noted some improvements in flexibility when putting on pants and shoes but continues to have trouble sitting or standing for prolonged periods and inability to sleep without disruptions from pain. Patient reports 0% improvement with PT and will continue to benefit from skilled PT services to modify and progress therapeutic interventions, analyze and address functional mobility deficits, analyze and address ROM deficits, analyze and address strength deficits, analyze and address soft tissue restrictions, analyze and cue for proper movement patterns, analyze and modify for postural abnormalities, and instruct in home and community integration to address functional deficits and attain remaining goals.      Primary mitigating factor which impeded progress:  None of these apply from preset list (refer to note for other details)     Functional Gains: easier to put on pants and shoes  Functional Deficits: trouble sitting for long periods, inability to sleep due to disruptions from pain, walking for prolonged periods is still painful.   % improvement: 0%  Pain   Average: 5/10                   Best: 0/10                 Worst: 6/10  Patient Goal: \"I want to be able to sit still and stand for prolonged periods without pain\" \"I want to get back to my normal workouts\"     Patient will continue to benefit from skilled PT / OT services to modify and progress therapeutic interventions, analyze

## 2025-06-18 NOTE — THERAPY RECERTIFICATION
[facility] VALDE[facility]  [address], [address], [place] [address]  Ph:[phone] Fx:[phone]  PHYSICAL THERAPY PROGRESS NOTE  Patient Name: Meli Mckeon : 1960   Medical/Treatment Diagnosis: Pain of right sacroiliac joint  Other low back pain   Referral Source: Boyd Tiwari DO     Date of Initial Visit: 25 Attended Visits: 2 Missed Visits: 0     SUMMARY OF TREATMENT    Patient has only attended 2 total PT sessions and missed 0 since IE. Patient has noted some improvements in flexibility when putting on pants and shoes but continues to have trouble sitting or standing for prolonged periods and inability to sleep without disruptions from pain. Patient reports 0% improvement with PT and will continue to benefit from skilled PT services to modify and progress therapeutic interventions, analyze and address functional mobility deficits, analyze and address ROM deficits, analyze and address strength deficits, analyze and address soft tissue restrictions, analyze and cue for proper movement patterns, analyze and modify for postural abnormalities, and instruct in home and community integration to address functional deficits and attain remaining goals.     CURRENT STATUS    Short Term Goals: To be accomplished in 2 WEEKS  1 patient will have established and be I with HEP to aid with independence and self management at discharge  EVAL HEP issued at evaluation  PN: reports compliance with HEP 1x/day   25; goal met    2 patient will have pain 10 to aid with increase tolerance to ADLS and regular daily activities at home and in the community  EVAL 2  PN: 6/10 max pain   25; declined     Long Term Goals: To be accomplished in 4 WEEKS  1 patient will have established and be I with HEP to aid with independence and self management at discharge  EVAL HEP issued at evaluation  PN: reports compliance with HEP 1x/day   25; goal met    2

## 2025-06-19 ENCOUNTER — OFFICE VISIT (OUTPATIENT)
Age: 65
End: 2025-06-19
Payer: COMMERCIAL

## 2025-06-19 ENCOUNTER — ANESTHESIA EVENT (OUTPATIENT)
Facility: HOSPITAL | Age: 65
End: 2025-06-19
Payer: COMMERCIAL

## 2025-06-19 ENCOUNTER — PREP FOR PROCEDURE (OUTPATIENT)
Age: 65
End: 2025-06-19

## 2025-06-19 ENCOUNTER — TELEPHONE (OUTPATIENT)
Facility: HOSPITAL | Age: 65
End: 2025-06-19

## 2025-06-19 VITALS
DIASTOLIC BLOOD PRESSURE: 74 MMHG | HEART RATE: 65 BPM | BODY MASS INDEX: 31.34 KG/M2 | WEIGHT: 166 LBS | SYSTOLIC BLOOD PRESSURE: 152 MMHG | HEIGHT: 61 IN | OXYGEN SATURATION: 100 %

## 2025-06-19 DIAGNOSIS — E11.9 DIABETES MELLITUS TYPE II, NON INSULIN DEPENDENT (HCC): ICD-10-CM

## 2025-06-19 DIAGNOSIS — R22.42 HIP MASS, LEFT: ICD-10-CM

## 2025-06-19 DIAGNOSIS — R22.42 HIP MASS, LEFT: Primary | ICD-10-CM

## 2025-06-19 PROCEDURE — 1123F ACP DISCUSS/DSCN MKR DOCD: CPT | Performed by: SURGERY

## 2025-06-19 PROCEDURE — 99214 OFFICE O/P EST MOD 30 MIN: CPT | Performed by: SURGERY

## 2025-06-19 PROCEDURE — 3046F HEMOGLOBIN A1C LEVEL >9.0%: CPT | Performed by: SURGERY

## 2025-06-19 NOTE — PROGRESS NOTES
Meli Mckeon is a 65 y.o. female (: 1960) presenting to address:    Chief Complaint   Patient presents with    Follow-up     Discuss Ct Hip Left for Left hip mass done on 25       Medication list and allergies have been reviewed with Meli Mckeon and updated as of today's date.     I have gone over all Medical, Surgical and Social History with Meli Mckeon and updated/added the information accordingly.       1. Have you been to the ER, Urgent Care or Hospitalized since your last visit? No          2. Have you followed up with your PCP or any other Physicians since your procedure/ last office visit?   No

## 2025-06-19 NOTE — PROGRESS NOTES
Instructions for your surgery at [facility]      Today's Date:  6/19/2025      Patient's Name:  Meli Mckeon           Surgery Date:  6/20/2025              Please enter the main entrance of the hospital and check-in at the front security desk located in the lobby. They will direct you to the area to report for your surgery.     Do NOT eat or drink anything, including candy, gum, or ice chips after midnight prior to your surgery, unless you have specific instructions from your surgeon or anesthesia provider to do so.  Brush your teeth before coming to the hospital. You may swish with water, but do not swallow.  No smoking/Vaping/E-Cigarettes 24 hours prior to the day of surgery.  No alcohol 24 hours prior to the day of surgery.  No recreational drugs for one week prior to the day of surgery.  Bring Photo ID, Insurance information, and Co-pay if required on day of surgery.  Bring in pertinent legal documents, such as, Medical Power of , DNR, Advance Directive, etc.  Leave all valuables, including money/purse, weapons at home.  Remove all jewelry, including ALL body piercings, nail polish, acrylic nails, and makeup (including mascara); no lotions, powders, deodorant, or perfume/cologne/after shave on the skin.  Follow instruction for Hibiclens washes and CHG wipes from surgeon's office.   Glasses and dentures may be worn to the hospital. They must be removed prior to surgery. Please bring case/container for glasses or dentures.   Contact lenses should not be worn on day of surgery.   Call your doctor's office if symptoms of a cold or illness develop within 24-48 hours prior to your surgery.  Call your doctor's office if you have any questions concerning insurance or co-pays.  15. AN ADULT (relative or friend 18 years or older) MUST DRIVE YOU HOME AFTER YOUR SURGERY.  16. Please make arrangements for a responsible adult (18 years or older) to be with you for 24 hours after your

## 2025-06-19 NOTE — TELEPHONE ENCOUNTER
Patient cancelled appt. on 6/19/25 at 12:57pm due to the patient having surgery and she is not able to make her appt.

## 2025-06-19 NOTE — PROGRESS NOTES
General Surgery Consult    Meli Mckeon  Admit date: (Not on file)    MRN: [id number]     : 1960     Age: 65 y.o.        Attending Physician: Zonia Savage MD [facility]      History of Present Illness:      Meli Mckeon is a 65 y.o. female who is here for follow-up on her left hip mass that is ulcerating and concerning for malignancy and to discuss the CT scan.  I have seen the patient few weeks ago and she presented with a left hip mass that has been present for a long time causing bleeding and ulceration.  I had a CT scan that showed no pathology at this point.  The patient went on vacation and she came back and she finished the course of antibiotic and she stated that still it is ulcerating with the same size and is causing oozing.     Patient Active Problem List    Diagnosis Date Noted    Loud snoring 2024    Excessive daytime sleepiness 2024    Bruxism, sleep-related 2024    Restless legs syndrome (RLS) 2024    Current moderate episode of major depressive disorder (HCC) 2024    Diabetes mellitus type II, non insulin dependent (HCC) 2024     Past Medical History:   Diagnosis Date    Depression     Endocrine disease     hypothyroidism, high cholesterol    Gastrointestinal disorder     acid reflux    Hyperlipidemia     Hypothyroidism     Psychiatric disorder     bipolar disorder and anxiety    Restless leg syndrome     Type 2 diabetes mellitus without complication ([facility])       Past Surgical History:   Procedure Laterality Date    GYN      hysterectomy    HAND/FINGER SURGERY UNLISTED      several trigger finger    OTHER SURGICAL HISTORY Right     bunionectomy    UROLOGICAL SURGERY      bladder surgery      Social History     Tobacco Use    Smoking status: Never    Smokeless tobacco: Never   Substance Use Topics    Alcohol use: No      Social History     Tobacco Use   Smoking Status Never   Smokeless Tobacco Never     Family History   Problem Relation Age of Onset

## 2025-06-20 ENCOUNTER — HOSPITAL ENCOUNTER (OUTPATIENT)
Facility: HOSPITAL | Age: 65
Setting detail: OUTPATIENT SURGERY
Discharge: HOME | End: 2025-06-20
Attending: SURGERY | Admitting: SURGERY
Payer: COMMERCIAL

## 2025-06-20 ENCOUNTER — APPOINTMENT (OUTPATIENT)
Facility: HOSPITAL | Age: 65
End: 2025-06-20
Attending: ORTHOPAEDIC SURGERY
Payer: COMMERCIAL

## 2025-06-20 ENCOUNTER — ANESTHESIA (OUTPATIENT)
Facility: HOSPITAL | Age: 65
End: 2025-06-20
Payer: COMMERCIAL

## 2025-06-20 VITALS
RESPIRATION RATE: 16 BRPM | BODY MASS INDEX: 30.78 KG/M2 | HEIGHT: 61 IN | WEIGHT: 163 LBS | SYSTOLIC BLOOD PRESSURE: 132 MMHG | TEMPERATURE: 97 F | HEART RATE: 80 BPM | OXYGEN SATURATION: 100 % | DIASTOLIC BLOOD PRESSURE: 68 MMHG

## 2025-06-20 DIAGNOSIS — R22.42 HIP MASS, LEFT: Primary | ICD-10-CM

## 2025-06-20 LAB
GLUCOSE BLD STRIP.AUTO-MCNC: 177 MG/DL (ref 70–110)
GLUCOSE BLD STRIP.AUTO-MCNC: 188 MG/DL (ref 70–110)

## 2025-06-20 PROCEDURE — 7100000011 HC PHASE II RECOVERY - ADDTL 15 MIN: Performed by: SURGERY

## 2025-06-20 PROCEDURE — 14301 TIS TRNFR ANY 30.1-60 SQ CM: CPT | Performed by: SURGERY

## 2025-06-20 PROCEDURE — 88305 TISSUE EXAM BY PATHOLOGIST: CPT

## 2025-06-20 PROCEDURE — 3600000002 HC SURGERY LEVEL 2 BASE: Performed by: SURGERY

## 2025-06-20 PROCEDURE — 2709999900 HC NON-CHARGEABLE SUPPLY: Performed by: SURGERY

## 2025-06-20 PROCEDURE — 3600000012 HC SURGERY LEVEL 2 ADDTL 15MIN: Performed by: SURGERY

## 2025-06-20 PROCEDURE — 7100000010 HC PHASE II RECOVERY - FIRST 15 MIN: Performed by: SURGERY

## 2025-06-20 PROCEDURE — 94761 N-INVAS EAR/PLS OXIMETRY MLT: CPT

## 2025-06-20 PROCEDURE — 88341 IMHCHEM/IMCYTCHM EA ADD ANTB: CPT

## 2025-06-20 PROCEDURE — 2700000000 HC OXYGEN THERAPY PER DAY

## 2025-06-20 PROCEDURE — 2580000003 HC RX 258: Performed by: NURSE ANESTHETIST, CERTIFIED REGISTERED

## 2025-06-20 PROCEDURE — 88342 IMHCHEM/IMCYTCHM 1ST ANTB: CPT

## 2025-06-20 PROCEDURE — 6360000002 HC RX W HCPCS: Performed by: NURSE ANESTHETIST, CERTIFIED REGISTERED

## 2025-06-20 PROCEDURE — 3700000001 HC ADD 15 MINUTES (ANESTHESIA): Performed by: SURGERY

## 2025-06-20 PROCEDURE — 6360000002 HC RX W HCPCS: Performed by: SURGERY

## 2025-06-20 PROCEDURE — 2500000003 HC RX 250 WO HCPCS: Performed by: NURSE ANESTHETIST, CERTIFIED REGISTERED

## 2025-06-20 PROCEDURE — 82962 GLUCOSE BLOOD TEST: CPT

## 2025-06-20 PROCEDURE — 27049 RESECT HIP/PELV TUM < 5 CM: CPT | Performed by: SURGERY

## 2025-06-20 PROCEDURE — 6370000000 HC RX 637 (ALT 250 FOR IP): Performed by: NURSE ANESTHETIST, CERTIFIED REGISTERED

## 2025-06-20 PROCEDURE — 6370000000 HC RX 637 (ALT 250 FOR IP): Performed by: ANESTHESIOLOGY

## 2025-06-20 PROCEDURE — 7100000000 HC PACU RECOVERY - FIRST 15 MIN: Performed by: SURGERY

## 2025-06-20 PROCEDURE — 3700000000 HC ANESTHESIA ATTENDED CARE: Performed by: SURGERY

## 2025-06-20 PROCEDURE — 7100000001 HC PACU RECOVERY - ADDTL 15 MIN: Performed by: SURGERY

## 2025-06-20 PROCEDURE — 2500000003 HC RX 250 WO HCPCS: Performed by: SURGERY

## 2025-06-20 PROCEDURE — 88307 TISSUE EXAM BY PATHOLOGIST: CPT

## 2025-06-20 RX ORDER — INSULIN LISPRO 100 [IU]/ML
0-12 INJECTION, SOLUTION INTRAVENOUS; SUBCUTANEOUS ONCE
Status: COMPLETED | OUTPATIENT
Start: 2025-06-20 | End: 2025-06-20

## 2025-06-20 RX ORDER — KETOROLAC TROMETHAMINE 15 MG/ML
INJECTION, SOLUTION INTRAMUSCULAR; INTRAVENOUS
Status: DISCONTINUED | OUTPATIENT
Start: 2025-06-20 | End: 2025-06-20 | Stop reason: SDUPTHER

## 2025-06-20 RX ORDER — FAMOTIDINE 20 MG/1
20 TABLET, FILM COATED ORAL ONCE
Status: COMPLETED | OUTPATIENT
Start: 2025-06-20 | End: 2025-06-20

## 2025-06-20 RX ORDER — OXYCODONE AND ACETAMINOPHEN 5; 325 MG/1; MG/1
1 TABLET ORAL EVERY 6 HOURS PRN
Qty: 12 TABLET | Refills: 0 | Status: SHIPPED | OUTPATIENT
Start: 2025-06-20 | End: 2025-06-23

## 2025-06-20 RX ORDER — PROPOFOL 10 MG/ML
INJECTION, EMULSION INTRAVENOUS
Status: DISCONTINUED | OUTPATIENT
Start: 2025-06-20 | End: 2025-06-20 | Stop reason: SDUPTHER

## 2025-06-20 RX ORDER — SODIUM CHLORIDE 0.9 % (FLUSH) 0.9 %
5-40 SYRINGE (ML) INJECTION PRN
Status: DISCONTINUED | OUTPATIENT
Start: 2025-06-20 | End: 2025-06-20 | Stop reason: HOSPADM

## 2025-06-20 RX ORDER — FENTANYL CITRATE 50 UG/ML
25 INJECTION, SOLUTION INTRAMUSCULAR; INTRAVENOUS EVERY 5 MIN PRN
Status: DISCONTINUED | OUTPATIENT
Start: 2025-06-20 | End: 2025-06-20 | Stop reason: HOSPADM

## 2025-06-20 RX ORDER — DEXTROSE MONOHYDRATE 100 MG/ML
INJECTION, SOLUTION INTRAVENOUS CONTINUOUS PRN
Status: DISCONTINUED | OUTPATIENT
Start: 2025-06-20 | End: 2025-06-20 | Stop reason: HOSPADM

## 2025-06-20 RX ORDER — PROCHLORPERAZINE EDISYLATE 5 MG/ML
5 INJECTION INTRAMUSCULAR; INTRAVENOUS
Status: DISCONTINUED | OUTPATIENT
Start: 2025-06-20 | End: 2025-06-20 | Stop reason: HOSPADM

## 2025-06-20 RX ORDER — EPHEDRINE SULFATE/0.9% NACL/PF 25 MG/5 ML
SYRINGE (ML) INTRAVENOUS
Status: DISCONTINUED | OUTPATIENT
Start: 2025-06-20 | End: 2025-06-20 | Stop reason: SDUPTHER

## 2025-06-20 RX ORDER — LIDOCAINE HYDROCHLORIDE 20 MG/ML
INJECTION, SOLUTION EPIDURAL; INFILTRATION; INTRACAUDAL; PERINEURAL
Status: DISCONTINUED | OUTPATIENT
Start: 2025-06-20 | End: 2025-06-20 | Stop reason: SDUPTHER

## 2025-06-20 RX ORDER — SODIUM CHLORIDE 9 MG/ML
INJECTION, SOLUTION INTRAVENOUS PRN
Status: DISCONTINUED | OUTPATIENT
Start: 2025-06-20 | End: 2025-06-20 | Stop reason: HOSPADM

## 2025-06-20 RX ORDER — FENTANYL CITRATE 50 UG/ML
INJECTION, SOLUTION INTRAMUSCULAR; INTRAVENOUS
Status: DISCONTINUED | OUTPATIENT
Start: 2025-06-20 | End: 2025-06-20 | Stop reason: SDUPTHER

## 2025-06-20 RX ORDER — SODIUM CHLORIDE, SODIUM LACTATE, POTASSIUM CHLORIDE, CALCIUM CHLORIDE 600; 310; 30; 20 MG/100ML; MG/100ML; MG/100ML; MG/100ML
INJECTION, SOLUTION INTRAVENOUS CONTINUOUS
Status: DISCONTINUED | OUTPATIENT
Start: 2025-06-20 | End: 2025-06-20 | Stop reason: HOSPADM

## 2025-06-20 RX ORDER — MIDAZOLAM HYDROCHLORIDE 1 MG/ML
INJECTION, SOLUTION INTRAMUSCULAR; INTRAVENOUS
Status: DISCONTINUED | OUTPATIENT
Start: 2025-06-20 | End: 2025-06-20 | Stop reason: SDUPTHER

## 2025-06-20 RX ORDER — ONDANSETRON 2 MG/ML
INJECTION INTRAMUSCULAR; INTRAVENOUS
Status: DISCONTINUED | OUTPATIENT
Start: 2025-06-20 | End: 2025-06-20 | Stop reason: SDUPTHER

## 2025-06-20 RX ORDER — SODIUM CHLORIDE 0.9 % (FLUSH) 0.9 %
5-40 SYRINGE (ML) INJECTION EVERY 12 HOURS SCHEDULED
Status: DISCONTINUED | OUTPATIENT
Start: 2025-06-20 | End: 2025-06-20 | Stop reason: HOSPADM

## 2025-06-20 RX ORDER — NALOXONE HYDROCHLORIDE 0.4 MG/ML
INJECTION, SOLUTION INTRAMUSCULAR; INTRAVENOUS; SUBCUTANEOUS PRN
Status: DISCONTINUED | OUTPATIENT
Start: 2025-06-20 | End: 2025-06-20 | Stop reason: HOSPADM

## 2025-06-20 RX ORDER — PROMETHAZINE HYDROCHLORIDE 12.5 MG/1
12.5 TABLET ORAL ONCE
Status: COMPLETED | OUTPATIENT
Start: 2025-06-20 | End: 2025-06-20

## 2025-06-20 RX ORDER — ACETAMINOPHEN 500 MG
1000 TABLET ORAL ONCE
Status: COMPLETED | OUTPATIENT
Start: 2025-06-20 | End: 2025-06-20

## 2025-06-20 RX ADMIN — PROPOFOL 150 MG: 10 INJECTION, EMULSION INTRAVENOUS at 09:41

## 2025-06-20 RX ADMIN — EPHEDRINE SULFATE 10 MG: 5 INJECTION INTRAVENOUS at 09:56

## 2025-06-20 RX ADMIN — SODIUM CHLORIDE, SODIUM LACTATE, POTASSIUM CHLORIDE, AND CALCIUM CHLORIDE: 600; 310; 30; 20 INJECTION, SOLUTION INTRAVENOUS at 09:30

## 2025-06-20 RX ADMIN — LIDOCAINE HYDROCHLORIDE 50 MG: 20 INJECTION, SOLUTION EPIDURAL; INFILTRATION; INTRACAUDAL; PERINEURAL at 09:41

## 2025-06-20 RX ADMIN — FAMOTIDINE 20 MG: 20 TABLET, FILM COATED ORAL at 09:21

## 2025-06-20 RX ADMIN — INSULIN LISPRO 3 UNITS: 100 INJECTION, SOLUTION INTRAVENOUS; SUBCUTANEOUS at 09:32

## 2025-06-20 RX ADMIN — ACETAMINOPHEN 1000 MG: 500 TABLET ORAL at 11:05

## 2025-06-20 RX ADMIN — KETOROLAC TROMETHAMINE 15 MG: 15 INJECTION, SOLUTION INTRAMUSCULAR; INTRAVENOUS at 09:59

## 2025-06-20 RX ADMIN — MIDAZOLAM 2 MG: 1 INJECTION, SOLUTION INTRAMUSCULAR; INTRAVENOUS at 09:35

## 2025-06-20 RX ADMIN — FENTANYL CITRATE 50 MCG: 50 INJECTION INTRAMUSCULAR; INTRAVENOUS at 09:44

## 2025-06-20 RX ADMIN — ONDANSETRON 4 MG: 2 INJECTION INTRAMUSCULAR; INTRAVENOUS at 09:59

## 2025-06-20 RX ADMIN — WATER 2000 MG: 1 INJECTION INTRAMUSCULAR; INTRAVENOUS; SUBCUTANEOUS at 09:44

## 2025-06-20 RX ADMIN — PROMETHAZINE HYDROCHLORIDE 12.5 MG: 12.5 TABLET ORAL at 09:21

## 2025-06-20 ASSESSMENT — PAIN DESCRIPTION - LOCATION
LOCATION: HIP
LOCATION: HIP

## 2025-06-20 ASSESSMENT — PAIN - FUNCTIONAL ASSESSMENT: PAIN_FUNCTIONAL_ASSESSMENT: 0-10

## 2025-06-20 ASSESSMENT — PAIN SCALES - GENERAL
PAINLEVEL_OUTOF10: 3
PAINLEVEL_OUTOF10: 3
PAINLEVEL_OUTOF10: 0
PAINLEVEL_OUTOF10: 3

## 2025-06-20 ASSESSMENT — PAIN DESCRIPTION - DESCRIPTORS
DESCRIPTORS: ACHING
DESCRIPTORS: ACHING

## 2025-06-20 ASSESSMENT — PAIN DESCRIPTION - ORIENTATION
ORIENTATION: LEFT
ORIENTATION: LEFT

## 2025-06-20 NOTE — ANESTHESIA PRE PROCEDURE
Department of Anesthesiology  Preprocedure Note       Name:  Meli Mckeon   Age:  65 y.o.  :  1960                                          MRN:  [id number]         Date:  2025      Surgeon: Surgeon(s):  Zonia Savage MD    Procedure: Procedure(s):  EXCISION OF LEFT HIP MASS    Medications prior to admission:   Prior to Admission medications    Medication Sig Start Date End Date Taking? Authorizing Provider   oxyCODONE-acetaminophen (PERCOCET) 5-325 MG per tablet Take 1 tablet by mouth every 6 hours as needed for Pain for up to 3 days. Intended supply: 3 days. Take lowest dose possible to manage pain Max Daily Amount: 4 tablets 25 Yes Zonia Savage MD   empagliflozin (JARDIANCE) 10 MG tablet Take 1 tablet by mouth daily 25   Dru Ball MD   metFORMIN (GLUCOPHAGE) 500 MG tablet Take 1 tablet by mouth in the morning, at noon, and at bedtime 25   Dru Ball MD   meloxicam (MOBIC) 15 MG tablet Take 1 tablet by mouth daily 25  Boyd Tiwari DO   methylphenidate (RITALIN) 10 MG tablet Take 1 tablet by mouth 3 times daily.    Caleb Murphy MD   Methylphenidate 54 MG CR tablet Take 1 tablet by mouth every morning.    Caleb Murphy MD   levothyroxine (SYNTHROID) 88 MCG tablet Take 1 tablet by mouth Daily    Caleb Murphy MD   blood glucose test strips (GLUCOSE METER TEST) strip 1 each by In Vitro route daily As needed. 25   Dru Ball MD   pramipexole (MIRAPEX) 1.5 MG tablet Take 1 tablet by mouth 3 times daily 25   Dru Ball MD   atorvastatin (LIPITOR) 40 MG tablet Take 1 tablet by mouth daily 25   Dru Ball MD   TRAZODONE HCL ER PO Take 100 mg by mouth once    Caleb Murphy MD   ARIPiprazole (ABILIFY) 15 MG tablet  11   Caleb Murphy MD   FLUoxetine (PROZAC) 20 MG capsule Take 1 capsule by mouth daily    Caleb Murphy MD   FLUoxetine

## 2025-06-20 NOTE — PROGRESS NOTES
Update History & Physical    The patient's History and Physical was reviewed with the patient and I examined the patient. There was no change. The surgical site was confirmed by the patient and me.       Plan: The risks, benefits, expected outcome, and alternative to the recommended procedure have been discussed with the patient. Patient understands and wants to proceed with the procedure. Will proceed with excision of left hip mass.    Electronically signed by Zonia Savage MD on 6/20/2025 at 8:12 AM

## 2025-06-20 NOTE — OP NOTE
Operative Note      Patient: Meli Mckeon  YOB: 1960  MRN: [id number]    Date of Procedure: 6/20/2025    Pre-Op Diagnosis Codes:      * Hip mass, left [R22.42]    Post-Op Diagnosis: Same       Procedure(s):  EXCISION OF LEFT HIP MASS    Surgeon(s):  Zonia Savage MD    Assistant:   Surgical Assistant: Luca Zeng    Anesthesia: General    Estimated Blood Loss (mL): Minimal    Complications: None    Specimens:   ID Type Source Tests Collected by Time Destination   A : left hip mass Tissue Hip SURGICAL PATHOLOGY Zonia Savage MD 6/20/2025 0951        Implants:  * No implants in log *      Drains: * No LDAs found *    Findings:  Infection Present At Time Of Surgery (PATOS) (choose all levels that have infection present):  No infection present  This procedure was not performed to treat primary cutaneous melanoma through wide local excision    Detailed Description of Procedure:   The patient was brought to operating room  and anesthesia was induced and the patient was placed in right lateral position with the left side up.  We removed the dressing and there was a an exophytic lesion with the oozing we prepped and draped the area in the usual manner and a timeout was performed.  The lesion measured around 4 cm in width and about 6 cm in length.  I marked about 6 cm from the middle of the lesion on the medial aspect and 6 cm toward the posterior aspect to do an elliptical skin excision.  We deepened dissection through the skin subcutaneous tissue to the multiple layers of fat almost to the prefascial area.  We marked the tumor with suture with the long lateral and short superior and we finished the excision and we sent it for permanent pathology.  Hemostasis was secured with cautery.  We did a slight flaps on the superior and inferior margin to be able to approximate the skin edges by dissecting the skin subcutaneous tissue from the underlying fascia.  The subcutaneous tissue was incised

## 2025-06-20 NOTE — ANESTHESIA POSTPROCEDURE EVALUATION
Department of Anesthesiology  Postprocedure Note    Patient: Meli Mckeon  MRN: [id number]  YOB: 1960  Date of evaluation: 6/20/2025    Procedure Summary       Date: 06/20/25 Room / Location: [facility] MAIN 07 / [facility] MAIN OR    Anesthesia Start: 0935 Anesthesia Stop: 1012    Procedure: EXCISION OF LEFT HIP MASS (Hip) Diagnosis:       Hip mass, left      (Hip mass, left [R22.42])    Surgeons: Zonia Savage MD Responsible Provider: Patricia Perez MD    Anesthesia Type: General ASA Status: 3            Anesthesia Type: General    Kelli Phase I: Kelli Score: 10    Kelli Phase II: Kelli Score: 10    Anesthesia Post Evaluation    Patient location during evaluation: bedside  Patient participation: complete - patient participated  Level of consciousness: awake  Airway patency: patent  Nausea & Vomiting: no nausea  Cardiovascular status: hemodynamically stable  Respiratory status: acceptable  Hydration status: euvolemic  Multimodal analgesia pain management approach  Pain management: adequate    No notable events documented.

## 2025-06-20 NOTE — DISCHARGE INSTRUCTIONS
Discharge Instructions Following Surgery    Patient: Meli Mckeon MRN: [id number]  SSN: [id number]    YOB: 1960  Age: 65 y.o.  Sex: female      Activity  As tolerated, walking encourage, stairs are okay.  Avoid strenuous activities - no lifting anything heavier than 15 pounds till seen in the clinic.  You may shower at home after 24 hours.  For the first 24 hours: do not Drive, Drink alcoholic beverages, or make important decisions.  Be aware of dizziness following anesthesia and while taking pain medication.    Diet and Medications  Regular diet after nausea from the anesthetic has passed.  Take pain medication as directed by your doctor.  Call your doctor if pain is NOT relieved by medication.    Wound and Dressing Care  Change Dressing as needed with Gauze and Tape  Apply ice packs to the area of the surgery for the first 1 to 2 days  Apply warm compresses after 2 days for pain relieve if needed    Call your doctor if  Excessive bleeding that does not stop after holding pressure over the area.  Temperature of 101 degrees F or above.  Redness,excessive swelling or bruising, and/or green or yellow, smelly discharge from incision.  If nausea and vomiting continues.    Follow-Up    Call the office of Dr. Zonia Savage at [phone] to make your follow-up appointment.    Dr. Savage's cell phone number is [phone]. Please call me if you have any concerns or questions.

## 2025-06-23 ENCOUNTER — OFFICE VISIT (OUTPATIENT)
Age: 65
End: 2025-06-23
Payer: COMMERCIAL

## 2025-06-23 ENCOUNTER — HOSPITAL ENCOUNTER (OUTPATIENT)
Facility: HOSPITAL | Age: 65
Setting detail: RECURRING SERIES
Discharge: HOME | End: 2025-06-26
Attending: ORTHOPAEDIC SURGERY
Payer: COMMERCIAL

## 2025-06-23 VITALS — BODY MASS INDEX: 30.93 KG/M2 | HEIGHT: 61 IN | WEIGHT: 163.8 LBS

## 2025-06-23 DIAGNOSIS — M53.3 PAIN OF RIGHT SACROILIAC JOINT: Primary | ICD-10-CM

## 2025-06-23 PROCEDURE — 97140 MANUAL THERAPY 1/> REGIONS: CPT

## 2025-06-23 PROCEDURE — 1123F ACP DISCUSS/DSCN MKR DOCD: CPT | Performed by: ORTHOPAEDIC SURGERY

## 2025-06-23 PROCEDURE — 97530 THERAPEUTIC ACTIVITIES: CPT

## 2025-06-23 PROCEDURE — 99213 OFFICE O/P EST LOW 20 MIN: CPT | Performed by: ORTHOPAEDIC SURGERY

## 2025-06-23 PROCEDURE — 97110 THERAPEUTIC EXERCISES: CPT

## 2025-06-23 NOTE — PROGRESS NOTES
Patient: Meli Mckeon                MRN: [id number]       SSN: [id number]  YOB: 1960        AGE: 65 y.o.        SEX: female  BMI: Body mass index is 30.95 kg/m².    PCP: Dru Ball MD  06/23/25    Chief Complaint: Follow-up (Right hip follow up )      1. Pain of right sacroiliac joint          HPI:  Meli Mckeon is a 65 y.o. female with chief complaint of   Chief Complaint   Patient presents with    Follow-up     Right hip follow up            4/14/2025     3:40 PM   AMB PAIN ASSESSMENT   Location of Pain Foot   Location Modifiers Right;Medial;Plantar   Severity of Pain 0     Allergies   Allergen Reactions    Codeine Other (See Comments)     Gets severe headache from codeine    headache    Penicillins Hives and Rash       New patient with right sided low back and hip pain that started about 6 weeks ago.  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  Within a week or so later she started getting some tingling and burning sensations in the anterior thigh.  This does not go all the way down to the knee and has never affected her feet.  She has not used any Tylenol or anti-inflammatories up to this point.    Known risk factors for perioperative complications:   Diabetes mellitus No results found for: \"LABA1C\", patient reports last A1c of 11.1  Obesity BMI < 40   BMI Readings from Last 1 Encounters:   06/23/25 30.95 kg/m²      Thyroid  Major depression  ADD      IMAGING:  Imaging read by myself and interpreted as follows:    April 21, 2025:  AP pelvis with AP of each hip and crosstable lateral of the right hip taken at [facility].  Joint space of the right hip and left hip is well-preserved.  There is a small osteophyte off the femoral head on the left hip.  There are no intraosseous abnormalities.  The partially visualized lower lumbar spine does reveal some slight scoliosis and disc space loss at the L4-L5

## 2025-06-23 NOTE — PROGRESS NOTES
PHYSICAL / OCCUPATIONAL THERAPY - DAILY TREATMENT NOTE    Patient Name: Meli Mckeon    Date: 2025    : 1960  Insurance: Payor: SANA PÉREZ / Plan: [employer] / Product Type: *No Product type* /      Patient  verified Yes     Visit #   Current / Total 1 8   Time   In / Out 408 456   Pain   In / Out 0 0   Subjective Functional Status/Changes: Denies pain at arrival. Notes she had surgery on her left hip this past Friday and that she is doing alright today.      TREATMENT AREA =  Pain of right sacroiliac joint  Other low back pain    OBJECTIVE    Modalities Rationale:     decrease pain and increase tissue extensibility to improve patient's ability to progress to PLOF and address remaining functional goals.     min [] Estim Unattended, type/location:                                      []  w/ice    []  w/heat    min [] Estim Attended, type/location:                                     []  w/US     []  w/ice    []  w/heat    []  TENS insruct      min []  Mechanical Traction: type/lbs                   []  pro   []  sup   []  int   []  cont    []  before manual    []  after manual    min []  Ultrasound, settings/location:     10 min  unbill [x]  Ice   post   []  Heat location/position:prone  to B gluts /LS     min []  Paraffin,  details:     min []  Vasopneumatic Device, press/temp:     min []  Whirlpool / Fluido:    If using vaso (only need to measure limb vaso being performed on)      pre-treatment girth :       post-treatment girth :       measured at (landmark location) :      min []  Other:    Skin assessment post-treatment:   Intact      Therapeutic Procedures:    Tx Min Billable or 1:1 Min (if diff from Tx Min) Procedure, Rationale, Specifics   15 15 [id number] Therapeutic Exercise (timed):  increase ROM, strength, coordination, balance, and proprioception to improve patient's ability to progress to PLOF and address remaining functional goals. (see flow sheet as applicable)

## 2025-06-25 ENCOUNTER — HOSPITAL ENCOUNTER (OUTPATIENT)
Facility: HOSPITAL | Age: 65
Setting detail: RECURRING SERIES
End: 2025-06-25
Attending: ORTHOPAEDIC SURGERY
Payer: COMMERCIAL

## 2025-06-25 ENCOUNTER — TELEPHONE (OUTPATIENT)
Facility: HOSPITAL | Age: 65
End: 2025-06-25

## 2025-06-25 NOTE — TELEPHONE ENCOUNTER
Patient cancelled appt. on 6/25/25 at 12:02pm due to the patient having surgery on her left hip on Fri, 6/20/25 & she is coming in for her right hip with us now and she is in some pain today & she is not able to make her appt. today.

## 2025-07-02 ENCOUNTER — HOSPITAL ENCOUNTER (OUTPATIENT)
Facility: HOSPITAL | Age: 65
Setting detail: RECURRING SERIES
Discharge: HOME | End: 2025-07-05
Attending: ORTHOPAEDIC SURGERY
Payer: COMMERCIAL

## 2025-07-02 PROCEDURE — 97140 MANUAL THERAPY 1/> REGIONS: CPT

## 2025-07-02 PROCEDURE — 97110 THERAPEUTIC EXERCISES: CPT

## 2025-07-02 PROCEDURE — 97530 THERAPEUTIC ACTIVITIES: CPT

## 2025-07-02 NOTE — PROGRESS NOTES
and address soft tissue restrictions, analyze and cue for proper movement patterns, analyze and modify for postural abnormalities, and instruct in home and community integration to address functional deficits and attain remaining goals.    Progress toward goals / Updated goals:  []  See Progress Note/Recertification    Short Term Goals: To be accomplished in 2 WEEKS  1 patient will have established and be I with HEP to aid with independence and self management at discharge  EVAL HEP issued at evaluation  PN: reports compliance with HEP 1x/day   6/18/25; goal met  Current: reports compliance 7/2/25     2 patient will have pain 1/10 to aid with increase tolerance to ADLS and regular daily activities at home and in the community  EVAL 2  PN: 6/10 max pain   6/18/25; declined  Current: pain at arrival 0 7/2/25     Long Term Goals: To be accomplished in 4 WEEKS  1 patient will have established and be I with HEP to aid with independence and self management at discharge  EVAL HEP issued at evaluation  PN: reports compliance with HEP 1x/day   6/18/25; goal met   Current: reports compliance 7/2/25  2 patient will have pain 0/10 to aid with increase tolerance to ADLS and regular daily activities at home and in the community  EVAL 1  PN: 6/10 max pain   6/18/25; declined  Current: pain at arrival 0 7/2/25     3 patient will have LEFS 61 to show increase tolerance and minimal projected gains with function at home and in the community  EVAL 52  PN: 57    6/18/25; improved   Current: NA 7/2/25  4 patient will report overall 50% improvement to aid with increase tolerance to prolonged sitting at home  EVAL pain worse with prolonged sitting   PN:   0% improvement; sitting for long periods increase L anterior thigh pain     6/18/25; no change   Current NA 6/23/25    Next PN due   7/18/25  Auth due (visit number/ date)    8 v thru 7/17/25    PLAN  - Continue Plan of Care  - Upgrade activities as tolerated    Elham Guevara, PT

## 2025-07-03 ENCOUNTER — OFFICE VISIT (OUTPATIENT)
Age: 65
End: 2025-07-03

## 2025-07-03 VITALS
DIASTOLIC BLOOD PRESSURE: 62 MMHG | OXYGEN SATURATION: 95 % | WEIGHT: 162 LBS | BODY MASS INDEX: 30.58 KG/M2 | SYSTOLIC BLOOD PRESSURE: 130 MMHG | TEMPERATURE: 97 F | HEART RATE: 81 BPM | HEIGHT: 61 IN

## 2025-07-03 DIAGNOSIS — C49.9 SARCOMA (HCC): ICD-10-CM

## 2025-07-03 DIAGNOSIS — R22.42 HIP MASS, LEFT: Primary | ICD-10-CM

## 2025-07-03 DIAGNOSIS — Z09 POSTOPERATIVE FOLLOW-UP: Primary | ICD-10-CM

## 2025-07-03 PROCEDURE — 99024 POSTOP FOLLOW-UP VISIT: CPT | Performed by: SURGERY

## 2025-07-03 NOTE — PROGRESS NOTES
Patient seen and examined.  She is doing really well.  Her wound is healing very well.  I removed the staples.  I explained to her that her pathology showed sarcoma and that they send the pathology to an outside facility as well for confirmation.  I told her that the margins of excisions are negative.  I told her that Pallavi will call her to schedule an appointment with medical oncology.

## 2025-07-03 NOTE — PROGRESS NOTES
Meli Mckeon is a 65 y.o. female (: 1960) presenting to address:    Chief Complaint   Patient presents with    Post-Op Check     Excision of left hip mass 25       Medication list and allergies have been reviewed with Meli Mckeon and updated as of today's date.     I have gone over all Medical, Surgical and Social History with Meli Mckeon and updated/added the information accordingly.       1. Have you been to the ER, Urgent Care or Hospitalized since your last visit? No          2. Have you followed up with your PCP or any other Physicians since your procedure/ last office visit?   No

## 2025-07-03 NOTE — TELEPHONE ENCOUNTER
Patient's pharmacy [facility] is requesting refills on Atorvastatin (LIPITOR) 40 MG tablet.     LOV: 5/3/2025  NOV: 7/7/2025    Requested Prescriptions     Pending Prescriptions Disp Refills    atorvastatin (LIPITOR) 40 MG tablet [Pharmacy Med Name: ATORVASTATIN 40 MG TABLET] 30 tablet 2     Sig: TAKE 1 TABLET BY MOUTH DAILY

## 2025-07-06 RX ORDER — ATORVASTATIN CALCIUM 40 MG/1
40 TABLET, FILM COATED ORAL DAILY
Qty: 30 TABLET | Refills: 2 | Status: SHIPPED | OUTPATIENT
Start: 2025-07-06

## 2025-07-07 ENCOUNTER — OFFICE VISIT (OUTPATIENT)
Facility: CLINIC | Age: 65
End: 2025-07-07
Payer: COMMERCIAL

## 2025-07-07 ENCOUNTER — OFFICE VISIT (OUTPATIENT)
Age: 65
End: 2025-07-07
Payer: COMMERCIAL

## 2025-07-07 VITALS
WEIGHT: 159.8 LBS | DIASTOLIC BLOOD PRESSURE: 77 MMHG | HEART RATE: 82 BPM | SYSTOLIC BLOOD PRESSURE: 128 MMHG | HEIGHT: 61 IN | OXYGEN SATURATION: 96 % | TEMPERATURE: 97.5 F | BODY MASS INDEX: 30.17 KG/M2 | RESPIRATION RATE: 14 BRPM

## 2025-07-07 DIAGNOSIS — E78.2 HYPERLIPIDEMIA, MIXED: ICD-10-CM

## 2025-07-07 DIAGNOSIS — C49.9 SARCOMA (HCC): ICD-10-CM

## 2025-07-07 DIAGNOSIS — M20.41 HAMMER TOE OF RIGHT FOOT: Primary | ICD-10-CM

## 2025-07-07 DIAGNOSIS — M21.619 BUNION OF GREAT TOE: ICD-10-CM

## 2025-07-07 DIAGNOSIS — E03.9 ACQUIRED HYPOTHYROIDISM: ICD-10-CM

## 2025-07-07 DIAGNOSIS — E11.9 DIABETES MELLITUS TYPE II, NON INSULIN DEPENDENT (HCC): Primary | ICD-10-CM

## 2025-07-07 LAB
GLUCOSE, POC: 176 MG/DL
HBA1C MFR BLD: 7.3 %

## 2025-07-07 PROCEDURE — 3051F HG A1C>EQUAL 7.0%<8.0%: CPT | Performed by: INTERNAL MEDICINE

## 2025-07-07 PROCEDURE — 99213 OFFICE O/P EST LOW 20 MIN: CPT | Performed by: ORTHOPAEDIC SURGERY

## 2025-07-07 PROCEDURE — 83036 HEMOGLOBIN GLYCOSYLATED A1C: CPT | Performed by: INTERNAL MEDICINE

## 2025-07-07 PROCEDURE — 99214 OFFICE O/P EST MOD 30 MIN: CPT | Performed by: INTERNAL MEDICINE

## 2025-07-07 PROCEDURE — 1123F ACP DISCUSS/DSCN MKR DOCD: CPT | Performed by: ORTHOPAEDIC SURGERY

## 2025-07-07 PROCEDURE — 82962 GLUCOSE BLOOD TEST: CPT | Performed by: INTERNAL MEDICINE

## 2025-07-07 PROCEDURE — 1123F ACP DISCUSS/DSCN MKR DOCD: CPT | Performed by: INTERNAL MEDICINE

## 2025-07-07 ASSESSMENT — ENCOUNTER SYMPTOMS
SHORTNESS OF BREATH: 0
ABDOMINAL DISTENTION: 0
ABDOMINAL DISTENTION: 0
SHORTNESS OF BREATH: 0
COUGH: 0
SORE THROAT: 0
ABDOMINAL PAIN: 0
COUGH: 0
ABDOMINAL PAIN: 0
SORE THROAT: 0

## 2025-07-07 ASSESSMENT — PATIENT HEALTH QUESTIONNAIRE - PHQ9
SUM OF ALL RESPONSES TO PHQ QUESTIONS 1-9: 2
1. LITTLE INTEREST OR PLEASURE IN DOING THINGS: SEVERAL DAYS
2. FEELING DOWN, DEPRESSED OR HOPELESS: SEVERAL DAYS

## 2025-07-07 NOTE — PROGRESS NOTES
AMBULATORY PROGRESS NOTE      Patient: Meli Mckeon             MRN: [id number]     SSN: [id number] There is no height or weight on file to calculate BMI.  YOB: 1960     AGE: 65 y.o.       EX: female    PCP: Dru Ball MD       IMPRESSION //  DIAGNOSIS AND TREATMENT PLAN      Meli Mckeon has a diagnosis of:      DIAGNOSES    1. Hammer toe of right foot    2. Bunion of great toe          PLAN:    1. Continue activities as tolerated with the custom trilaminar orthotic     RTO PRN    No orders of the defined types were placed in this encounter.       There are no Patient Instructions on file for this visit.      Please follow up with your PCP for any health maintenance as recommended.         Meli Mckeon  expresses understanding of the diagnosis, treatment plan, and all of their proposed questions were answered to their satisfaction. Patient education has been provided re the diagnoses.         HPI //  OBJECTIVE EXAMINATION      Meli Mckeon IS A 65 y.o. female who is a/an  established patient, presenting to my outpatient office for evaluation of  the following chief complaint(s):     Chief Complaint   Patient presents with    Follow-up     Right foot       Patient presents for a right foot follow up. She did obtain the custom orthotic at the end of June that she wears with [employer] tennis shoes. She notices a benefit when wearing the orthotic, has less pain to the plantar forefoot when walking. She went to [place] recently and did a lot of walking in the orthotic, had no significant pain. She still has a slight lump to the plantar forefoot that is getting smaller. Of note: she uses a silicone toe spacer for the first and second toes to correct her alignment which is helpful. The spacer sits on the great toe and has a depression to accommodate the second toe.     Patient is employed at: [employer]      There were no vitals taken for this

## 2025-07-07 NOTE — PROGRESS NOTES
Meli Mckeon is a 65 y.o. female (: 1960) presenting to address:    No chief complaint on file.      There were no vitals filed for this visit.    \"Have you been to the ER, urgent care clinic since your last visit?  Hospitalized since your last visit?\"    NO    “Have you seen or consulted any other health care providers outside of [facility] since your last visit?”    NO    “Have you had a colorectal cancer screening such as a colonoscopy/FIT/Cologuard?    NO    No colonoscopy on file  No cologuard on file  No FIT/FOBT on file   No flexible sigmoidoscopy on file        Have you had a mammogram?”   NO    Date of last Mammogram: 2018      “Have you had a pap smear?”    NO    No cervical cancer screening on file         
\"CHOL\", \"TRIG\", \"HDL\", \"LDL\", \"VLDL\", \"CHOLHDLRATIO\"   No results found for: \"LABA1C\"   No results found for: \"VOL\", \"APPEARANCE\", \"COLORU\", \"LABSPEC\", \"LABPH\", \"LEUKBLD\", \"NITRU\", \"GLUCOSEU\", \"KETUA\", \"UROBILINOGEN\", \"BILIRUBINUR\", \"OCBU\"     Disclaimer:    I have discussed the diagnosis with the patient and the intended plan as seen above.The patient understands our medical plan. The risks, benefits and significant side effects of all medications have been reviewed. Anticipated time course and progression of condition reviewed. All questions have been addressed.  She received an after visit summary, with information reviewed, and questions answered.      Where appropriate, she is instructed to call the clinic if she has not been notified either by phone or through [employer] with the results of her tests or with an appointment plan for any referrals within 1 week(s). The patient  is to call if her condition worsens or fails to improve or if significant side effects are experienced.       Dru Ball MD

## 2025-07-08 ENCOUNTER — HOSPITAL ENCOUNTER (OUTPATIENT)
Facility: HOSPITAL | Age: 65
Setting detail: RECURRING SERIES
Discharge: HOME | End: 2025-07-11
Attending: ORTHOPAEDIC SURGERY
Payer: COMMERCIAL

## 2025-07-08 LAB
ALBUMIN SERPL-MCNC: 4.7 G/DL (ref 3.9–4.9)
ALP SERPL-CCNC: 102 IU/L (ref 44–121)
ALT SERPL-CCNC: 21 IU/L (ref 0–32)
APPEARANCE UR: CLEAR
AST SERPL-CCNC: 20 IU/L (ref 0–40)
BASOPHILS # BLD AUTO: 0 X10E3/UL (ref 0–0.2)
BASOPHILS NFR BLD AUTO: 1 %
BILIRUB SERPL-MCNC: 0.4 MG/DL (ref 0–1.2)
BILIRUB UR QL STRIP: NEGATIVE
BUN SERPL-MCNC: 7 MG/DL (ref 8–27)
BUN/CREAT SERPL: 10 (ref 12–28)
CALCIUM SERPL-MCNC: 9.9 MG/DL (ref 8.7–10.3)
CHLORIDE SERPL-SCNC: 101 MMOL/L (ref 96–106)
CHOLEST SERPL-MCNC: 178 MG/DL (ref 100–199)
CO2 SERPL-SCNC: 19 MMOL/L (ref 20–29)
COLOR UR: YELLOW
CREAT SERPL-MCNC: 0.72 MG/DL (ref 0.57–1)
EGFRCR SERPLBLD CKD-EPI 2021: 93 ML/MIN/1.73
EOSINOPHIL # BLD AUTO: 0.2 X10E3/UL (ref 0–0.4)
EOSINOPHIL NFR BLD AUTO: 5 %
ERYTHROCYTE [DISTWIDTH] IN BLOOD BY AUTOMATED COUNT: 13.1 % (ref 11.7–15.4)
GLOBULIN SER CALC-MCNC: 2.3 G/DL (ref 1.5–4.5)
GLUCOSE SERPL-MCNC: 147 MG/DL (ref 70–99)
GLUCOSE UR QL STRIP: ABNORMAL
HCT VFR BLD AUTO: 39.3 % (ref 34–46.6)
HDLC SERPL-MCNC: 52 MG/DL
HGB BLD-MCNC: 13.2 G/DL (ref 11.1–15.9)
HGB UR QL STRIP: NEGATIVE
IMM GRANULOCYTES # BLD AUTO: 0 X10E3/UL (ref 0–0.1)
IMM GRANULOCYTES NFR BLD AUTO: 0 %
KETONES UR QL STRIP: NEGATIVE
LDLC SERPL CALC-MCNC: 99 MG/DL (ref 0–99)
LEUKOCYTE ESTERASE UR QL STRIP: NEGATIVE
LYMPHOCYTES # BLD AUTO: 2.8 X10E3/UL (ref 0.7–3.1)
LYMPHOCYTES NFR BLD AUTO: 56 %
MCH RBC QN AUTO: 33.2 PG (ref 26.6–33)
MCHC RBC AUTO-ENTMCNC: 33.6 G/DL (ref 31.5–35.7)
MCV RBC AUTO: 99 FL (ref 79–97)
MONOCYTES # BLD AUTO: 0.2 X10E3/UL (ref 0.1–0.9)
MONOCYTES NFR BLD AUTO: 5 %
NEUTROPHILS # BLD AUTO: 1.6 X10E3/UL (ref 1.4–7)
NEUTROPHILS NFR BLD AUTO: 33 %
NITRITE UR QL STRIP: NEGATIVE
PH UR STRIP: 5.5 [PH] (ref 5–7.5)
PLATELET # BLD AUTO: 237 X10E3/UL (ref 150–450)
POTASSIUM SERPL-SCNC: 5.4 MMOL/L (ref 3.5–5.2)
PROT SERPL-MCNC: 7 G/DL (ref 6–8.5)
PROT UR QL STRIP: NEGATIVE
RBC # BLD AUTO: 3.97 X10E6/UL (ref 3.77–5.28)
SODIUM SERPL-SCNC: 138 MMOL/L (ref 134–144)
SP GR UR STRIP: 1.02 (ref 1–1.03)
TRIGL SERPL-MCNC: 156 MG/DL (ref 0–149)
UROBILINOGEN UR STRIP-MCNC: 0.2 MG/DL (ref 0.2–1)
VLDLC SERPL CALC-MCNC: 27 MG/DL (ref 5–40)
WBC # BLD AUTO: 4.9 X10E3/UL (ref 3.4–10.8)

## 2025-07-08 PROCEDURE — 97140 MANUAL THERAPY 1/> REGIONS: CPT

## 2025-07-08 PROCEDURE — 97530 THERAPEUTIC ACTIVITIES: CPT

## 2025-07-08 PROCEDURE — 97110 THERAPEUTIC EXERCISES: CPT

## 2025-07-10 ENCOUNTER — HOSPITAL ENCOUNTER (OUTPATIENT)
Facility: HOSPITAL | Age: 65
Setting detail: RECURRING SERIES
Discharge: HOME | End: 2025-07-13
Attending: ORTHOPAEDIC SURGERY
Payer: COMMERCIAL

## 2025-07-10 PROCEDURE — 97110 THERAPEUTIC EXERCISES: CPT

## 2025-07-10 PROCEDURE — 97530 THERAPEUTIC ACTIVITIES: CPT

## 2025-07-10 PROCEDURE — 97140 MANUAL THERAPY 1/> REGIONS: CPT

## 2025-07-10 NOTE — PROGRESS NOTES
PHYSICAL / OCCUPATIONAL THERAPY - DAILY TREATMENT NOTE    Patient Name: Meli Mckeon    Date: 7/10/2025    : 1960  Insurance: Payor: SANA PÉREZ / Plan: [employer] / Product Type: *No Product type* /      Patient  verified Yes     Visit #   Current / Total  4 8   Time   In / Out 413 505   Pain   In / Out 2 <2 better   Subjective Functional Status/Changes: Reports that she has lower back pain with sitting.     TREATMENT AREA =  Pain of right sacroiliac joint  Other low back pain    OBJECTIVE    Modalities Rationale:     decrease pain and increase tissue extensibility to improve patient's ability to progress to PLOF and address remaining functional goals.     min [] Estim Unattended, type/location:                                      []  w/ice    []  w/heat    min [] Estim Attended, type/location:                                     []  w/US     []  w/ice    []  w/heat    []  TENS insruct      min []  Mechanical Traction: type/lbs                   []  pro   []  sup   []  int   []  cont    []  before manual    []  after manual    min []  Ultrasound, settings/location:     10 min  unbill [x]  Ice  post   []  Heat    location/position:prone to B LS and gluts     min []  Paraffin,  details:     min []  Vasopneumatic Device, press/temp:     min []  Whirlpool / Fluido:    If using vaso (only need to measure limb vaso being performed on)      pre-treatment girth :       post-treatment girth :       measured at (landmark location) :      min []  Other:    Skin assessment post-treatment:   Intact      Therapeutic Procedures:    Tx Min Billable or 1:1 Min (if diff from Tx Min) Procedure, Rationale, Specifics    84035 Therapeutic Exercise (timed):  increase ROM, strength, coordination, balance, and proprioception to improve patient's ability to progress to PLOF and address remaining functional goals. (see flow sheet as applicable)     Details if applicable:        53613 Therapeutic

## 2025-07-21 DIAGNOSIS — E11.9 DIABETES MELLITUS TYPE II, NON INSULIN DEPENDENT (HCC): ICD-10-CM

## 2025-07-21 NOTE — TELEPHONE ENCOUNTER
Last Visit: 7/7/2025   Next Appointment: 10/7/2025      Requested Prescriptions     Pending Prescriptions Disp Refills    metFORMIN (GLUCOPHAGE) 500 MG tablet [Pharmacy Med Name: METFORMIN  MG TABLET] 60 tablet      Sig: TAKE 1 TABLET BY MOUTH 2 TIMES A DAY WITH A MEAL

## 2025-08-07 ENCOUNTER — TRANSCRIBE ORDERS (OUTPATIENT)
Facility: HOSPITAL | Age: 65
End: 2025-08-07

## 2025-08-07 DIAGNOSIS — C49.9 SARCOMA OF SOFT TISSUE (HCC): Primary | ICD-10-CM

## 2025-08-08 ENCOUNTER — HOSPITAL ENCOUNTER (OUTPATIENT)
Age: 65
Discharge: HOME | End: 2025-08-11
Payer: COMMERCIAL

## 2025-08-08 DIAGNOSIS — C49.9 SARCOMA OF SOFT TISSUE (HCC): ICD-10-CM

## 2025-08-08 PROCEDURE — 2500000003 HC RX 250 WO HCPCS

## 2025-08-08 PROCEDURE — 74177 CT ABD & PELVIS W/CONTRAST: CPT

## 2025-08-08 PROCEDURE — 6360000004 HC RX CONTRAST MEDICATION

## 2025-08-08 RX ORDER — IOPAMIDOL 612 MG/ML
100 INJECTION, SOLUTION INTRAVASCULAR
Status: CANCELLED | OUTPATIENT
Start: 2025-08-08

## 2025-08-08 RX ORDER — IOPAMIDOL 612 MG/ML
100 INJECTION, SOLUTION INTRAVASCULAR
Status: COMPLETED | OUTPATIENT
Start: 2025-08-08 | End: 2025-08-08

## 2025-08-08 RX ADMIN — IOPAMIDOL 100 ML: 612 INJECTION, SOLUTION INTRAVENOUS at 10:50

## 2025-08-08 RX ADMIN — BARIUM SULFATE 900 ML: 20 SUSPENSION ORAL at 10:50

## 2025-08-20 DIAGNOSIS — E11.9 DIABETES MELLITUS TYPE II, NON INSULIN DEPENDENT (HCC): ICD-10-CM

## (undated) DEVICE — SOLUTION IRRIG 1000ML 0.9% SOD CHL USP POUR PLAS BTL

## (undated) DEVICE — BLADE,STAINLESS-STEEL,15,STRL,DISPOSABLE: Brand: MEDLINE

## (undated) DEVICE — Device

## (undated) DEVICE — APPLICATOR MEDICATED 26 CC SOLUTION HI LT ORNG CHLORAPREP

## (undated) DEVICE — SHEET, T, LAPAROTOMY, STERILE: Brand: MEDLINE

## (undated) DEVICE — GLOVE ORANGE PI 7 1/2   MSG9075

## (undated) DEVICE — DRAPE TOWEL: Brand: CONVERTORS

## (undated) DEVICE — ELECTRODE PT RET AD L9FT HI MOIST COND ADH HYDRGEL CORDED

## (undated) DEVICE — LIQUIBAND RAPID ADHESIVE 36/CS 0.8ML: Brand: MEDLINE